# Patient Record
Sex: MALE | Race: BLACK OR AFRICAN AMERICAN | NOT HISPANIC OR LATINO | Employment: FULL TIME | URBAN - METROPOLITAN AREA
[De-identification: names, ages, dates, MRNs, and addresses within clinical notes are randomized per-mention and may not be internally consistent; named-entity substitution may affect disease eponyms.]

---

## 2021-04-21 ENCOUNTER — TRANSFERRED RECORDS (OUTPATIENT)
Dept: HEALTH INFORMATION MANAGEMENT | Facility: CLINIC | Age: 31
End: 2021-04-21

## 2021-04-21 ENCOUNTER — MEDICAL CORRESPONDENCE (OUTPATIENT)
Dept: HEALTH INFORMATION MANAGEMENT | Facility: CLINIC | Age: 31
End: 2021-04-21

## 2021-04-22 ENCOUNTER — TRANSCRIBE ORDERS (OUTPATIENT)
Dept: OTHER | Age: 31
End: 2021-04-22

## 2021-04-22 DIAGNOSIS — H40.002 GLAUCOMA SUSPECT, LEFT: Primary | ICD-10-CM

## 2021-05-05 ENCOUNTER — OFFICE VISIT (OUTPATIENT)
Dept: OPHTHALMOLOGY | Facility: CLINIC | Age: 31
End: 2021-05-05
Attending: OPTOMETRIST
Payer: COMMERCIAL

## 2021-05-05 DIAGNOSIS — H40.002 GLAUCOMA SUSPECT, LEFT: ICD-10-CM

## 2021-05-05 DIAGNOSIS — H47.20 OPTIC NERVE ATROPHY, LEFT: Primary | ICD-10-CM

## 2021-05-05 PROCEDURE — 92133 CPTRZD OPH DX IMG PST SGM ON: CPT | Performed by: OPHTHALMOLOGY

## 2021-05-05 PROCEDURE — G0463 HOSPITAL OUTPT CLINIC VISIT: HCPCS

## 2021-05-05 PROCEDURE — 76514 ECHO EXAM OF EYE THICKNESS: CPT | Mod: 26 | Performed by: OPHTHALMOLOGY

## 2021-05-05 PROCEDURE — 92020 GONIOSCOPY: CPT | Mod: 59 | Performed by: OPHTHALMOLOGY

## 2021-05-05 PROCEDURE — 92004 COMPRE OPH EXAM NEW PT 1/>: CPT | Performed by: OPHTHALMOLOGY

## 2021-05-05 RX ORDER — TIMOLOL MALEATE 6.8 MG/ML
1 SOLUTION/ DROPS OPHTHALMIC 2 TIMES DAILY
COMMUNITY
Start: 2021-04-21 | End: 2022-03-07

## 2021-05-05 RX ORDER — LATANOPROST 50 UG/ML
1 SOLUTION/ DROPS OPHTHALMIC AT BEDTIME
COMMUNITY
Start: 2021-04-21 | End: 2022-03-07

## 2021-05-05 ASSESSMENT — VISUAL ACUITY
METHOD: SNELLEN - LINEAR
OD_SC: 20/20
OS_SC: 20/125
OS_PH_SC: 20/60

## 2021-05-05 ASSESSMENT — PACHYMETRY
OS_CT(UM): 469
OD_CT(UM): 491

## 2021-05-05 ASSESSMENT — CONF VISUAL FIELD
OS_NORMAL: 1
OD_NORMAL: 1
METHOD: COUNTING FINGERS

## 2021-05-05 ASSESSMENT — EXTERNAL EXAM - LEFT EYE: OS_EXAM: NORMAL

## 2021-05-05 ASSESSMENT — TONOMETRY
OD_IOP_MMHG: 18
IOP_METHOD: APPLANATION
OS_IOP_MMHG: 20

## 2021-05-05 ASSESSMENT — SLIT LAMP EXAM - LIDS
COMMENTS: NORMAL
COMMENTS: NORMAL

## 2021-05-05 ASSESSMENT — EXTERNAL EXAM - RIGHT EYE: OD_EXAM: NORMAL

## 2021-05-05 NOTE — LETTER
"5/5/2021       RE: Silviano Edward  [address]  [address]  [place] [address]       CC -   Glaucoma evaluation    INTERVAL HISTORY - Initial visit    HPI -    Silviano Edward is a  30 year old year-old patient presenting for glaucoma evaluation. Pt noted in Sept 2020 when renewing license at the [employer] that his LE seemed to have very poor vision compared to the right. Pt states he was not aware of this difference until then. Never had an eye examination before. He did not seek medical care at that time because he was \"able to read.\"   He states he finally made an eye exam appointment and was seen on 4/21/2021 when it was determined he needed full glaucoma evaluation as pt's IOP was elevated. He was started on Timolol BID to LE and Latanoprost at bedtime to LE but he stopped using them four days ago as 'LE was becoming red and itchy'.   Negative for hx ocular infections, trauma, strabismus.  He has no other systemic diagnosis; that he is very healthy. Notes occasional headaches. Denies seizures or other neurologic diseases.   Negative for family history of ocular and systemic disease.    Works with [employer] (working on [employer]).     RETINAL IMAGING:  OCT 5/5/21  OD - within normal limits   OS - global thinning       ASSESSMENT & PLAN    1. Optic nerve atrophy, left    2. Glaucoma suspect, left    Incidental finding of left vision loss with unknown duration  Left eye optic atrophy with shunt vessels; no signs of previous retinal vascular accident  Mild proptosis: Jean measurement at 100 mm: 18 right eye and 21 left eye   DDx include optic nerve sheet/skull base/optic canal meningioma vs other mass lesions causing compressive optic atophy  Discussed the findings and its differentials with the patient.     MRI brain and orbit with and without contrast ordered  Refer to Neuro-ophthalmology clinic with Dr. Hernandez      Complete documentation of historical and exam elements from today's encounter can be found in " the full encounter summary report (not reduplicated in this progress note). I personally obtained the chief complaint(s) and history of present illness.  I confirmed and edited as necessary the review of systems, past medical/surgical history, family history, social history, and examination findings as documented by others; and I examined the patient myself. I personally reviewed the relevant tests, images, and reports as documented above. I formulated and edited as necessary the assessment and plan and discussed the findings and management plan with the patient and family.     Teddy Morel MD, PhD  , Vitreoretinal Surgery  Department of Ophthalmology  [facility]

## 2021-05-05 NOTE — NURSING NOTE
Chief Complaints and History of Present Illnesses   Patient presents with     Glaucoma Evaluation     Chief Complaint(s) and History of Present Illness(es)     Glaucoma Evaluation     Laterality: left eye    Quality: blurred    Associated symptoms: Negative for eye pain, floaters, itching, discharge, photophobia, previous episodes and tearing    Treatment side effects: redness and itching    Compliance with Treatment: Stopped last Saturday as LE was becoming red and itchy. Otherwise, pt states he was taking faithfully since drops first prescribed on 4/21/21.  Alicia Brothers COT COT 7:42 AM 05/05/2021      Pain scale: 0/10              Comments     Referred by Sarah Sprague O.D. at [facility].  Pt noted in Sept when renewing license at the [facility] that his LE seemed to have very poor vision compared to the right. Pt states he was not aware of this difference until then.  Pt states he waited for awhile to see if VA would improve before having an eye exam. He states he finally made an eye exam appointment and was seen on 4/21/2021 by Dr. Sprague when it was determined he needed full glaucoma evaluation as pt's IOP was elevated. Pt states he has never worn glasses or contacts that 4/21/2021 this was his very first eye exam.  Pt states he was started on Timolol BID to LE and Latanoprost at bedtime to LE. He stopped using both last Saturday as 'LE was becoming red and itchy'.  Pt states negative for ocular infections, trauma, strabismus.  Pt states he has no other systemic diagnosis; that he is very healthy.  Pt states negative for family ocular history and systemic disease.    MONICO Hopper COT 7:45 AM 05/05/2021

## 2021-05-05 NOTE — LETTER
5/5/2021      RE: Silviano Edward  [address]  [address]  [place] [address]       CC -   Glaucoma evaluation    INTERVAL HISTORY - Initial visit    HPI -   Silviano Edward is a  30 year old year-old patient presenting for glaucoma evaluation. Pt noted in Sept when renewing license at the [facility] that his LE seemed to have very poor vision compared to the right. Pt states he was not aware of this difference until then.    He noticed vision left eye is not normal September 2020. He did not seek medical care at that time because he was able to read.     Pt states he waited for awhile to see if VA would improve before having an eye exam. He states he finally made an eye exam appointment and was seen on 4/21/2021 when it was determined he needed full glaucoma evaluation as pt's IOP was elevated. Pt states he has never worn glasses or contacts that 4/21/2021 this was his very first eye exam.  Left eye gets smaller with headaches.     Pt states he was started on Timolol BID to LE and Latanoprost at bedtime to LE. He stopped using both four days ago as 'LE was becoming red and itchy'.  Pt states negative for ocular infections, trauma, strabismus.  Pt states he has no other systemic diagnosis; that he is very healthy.  Pt states negative for family ocular history and systemic disease.    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     PAST OCULAR SURGERY      RETINAL IMAGING:  OCT 5/5/21  OD - within normal limits   OS - global thinning       ASSESSMENT & PLAN    1. Glaucoma suspect, left        Jean measurement at 100: 18 right eye and 21 left eye   MRI brain and orbit with and without contrast ordered    return to clinic: ***    Complete documentation of historical and exam elements from today's encounter can be found in the full encounter summary report (not reduplicated in this progress note). I personally obtained the chief complaint(s) and history of present illness.  I confirmed and edited as necessary the review  "of systems, past medical/surgical history, family history, social history, and examination findings as documented by others; and I examined the patient myself. I personally reviewed the relevant tests, images, and reports as documented above. I formulated and edited as necessary the assessment and plan and discussed the findings and management plan with the patient and family.     Teddy Morel MD      CC -   Glaucoma evaluation    INTERVAL HISTORY - Initial visit    HPI -    Silviano Edward is a  30 year old year-old patient presenting for glaucoma evaluation. Pt noted in Sept 2020 when renewing license at the [facility] that his LE seemed to have very poor vision compared to the right. Pt states he was not aware of this difference until then. Never had an eye examination before. He did not seek medical care at that time because he was \"able to read.\"   He states he finally made an eye exam appointment and was seen on 4/21/2021 when it was determined he needed full glaucoma evaluation as pt's IOP was elevated. He was started on Timolol BID to LE and Latanoprost at bedtime to LE but he stopped using them four days ago as 'LE was becoming red and itchy'.   Negative for hx ocular infections, trauma, strabismus.  He has no other systemic diagnosis; that he is very healthy. Notes occasional headaches. Denies seizures or other neurologic diseases.   Negative for family history of ocular and systemic disease.    Works with [employer] (working on [employer]).     RETINAL IMAGING:  OCT 5/5/21  OD - within normal limits   OS - global thinning       ASSESSMENT & PLAN    1. Optic nerve atrophy, left    2. Glaucoma suspect, left    Incidental finding of left vision loss with unknown duration  Left eye optic atrophy with shunt vessels; no signs of previous retinal vascular accident  Mild proptosis: Jean measurement at 100 mm: 18 right eye and 21 left eye   DDx include optic nerve sheet/skull base/optic canal meningioma vs other " mass lesions causing compressive optic atophy  Discussed the findings and its differentials with the patient.     MRI brain and orbit with and without contrast ordered  Refer to Neuro-ophthalmology clinic with Dr. Hernandez      Complete documentation of historical and exam elements from today's encounter can be found in the full encounter summary report (not reduplicated in this progress note). I personally obtained the chief complaint(s) and history of present illness.  I confirmed and edited as necessary the review of systems, past medical/surgical history, family history, social history, and examination findings as documented by others; and I examined the patient myself. I personally reviewed the relevant tests, images, and reports as documented above. I formulated and edited as necessary the assessment and plan and discussed the findings and management plan with the patient and family.     Teddy Morel MD, PhD  , Vitreoretinal Surgery  Department of Ophthalmology  [facility]

## 2021-05-05 NOTE — PROGRESS NOTES
"CC -   Glaucoma evaluation    INTERVAL HISTORY - Initial visit    HPI -    Litzyal JOHN Edward is a  30 year old year-old patient presenting for glaucoma evaluation. Pt noted in Sept 2020 when [facility] [employer] that his LE seemed to have very poor vision compared to the right. Pt states he was not aware of this difference until then. Never had an eye examination before. He did not seek medical care at that time because he was \"able to read.\"   He states he finally made an eye exam appointment and was seen on 4/21/2021 when it was determined he needed full glaucoma evaluation as pt's IOP was elevated. He was started on Timolol BID to LE and Latanoprost at bedtime to LE but he stopped using them four days ago as 'LE was becoming red and itchy'.   Negative for hx ocular infections, trauma, strabismus.  He has no other systemic diagnosis; that he is very healthy. Notes occasional headaches. Denies seizures or other neurologic diseases.   Negative for family history of ocular and systemic disease.    Works with [employer] (working on [employer]).     RETINAL IMAGING:  OCT 5/5/21  OD - within normal limits   OS - global thinning       ASSESSMENT & PLAN    1. Optic nerve atrophy, left    2. Glaucoma suspect, left    Incidental finding of left vision loss with unknown duration  Left eye optic atrophy with shunt vessels; no signs of previous retinal vascular accident  Mild proptosis: Jean measurement at 100 mm: 18 right eye and 21 left eye   DDx include optic nerve sheet/skull base/optic canal meningioma vs other mass lesions causing compressive optic atophy  Discussed the findings and its differentials with the patient.     MRI brain and orbit with and without contrast ordered  Refer to Neuro-ophthalmology clinic with Dr. Hernandez      Complete documentation of historical and exam elements from today's encounter can be found in the full encounter summary report (not reduplicated in this progress note). I " personally obtained the chief complaint(s) and history of present illness.  I confirmed and edited as necessary the review of systems, past medical/surgical history, family history, social history, and examination findings as documented by others; and I examined the patient myself. I personally reviewed the relevant tests, images, and reports as documented above. I formulated and edited as necessary the assessment and plan and discussed the findings and management plan with the patient and family.     Teddy Morel MD, PhD  , Vitreoretinal Surgery  Department of Ophthalmology  [facility]

## 2021-05-25 ENCOUNTER — ANCILLARY PROCEDURE (OUTPATIENT)
Dept: MRI IMAGING | Facility: CLINIC | Age: 31
End: 2021-05-25
Attending: OPHTHALMOLOGY
Payer: COMMERCIAL

## 2021-05-25 DIAGNOSIS — H47.20 OPTIC NERVE ATROPHY, LEFT: ICD-10-CM

## 2021-05-25 PROCEDURE — 70543 MRI ORBT/FAC/NCK W/O &W/DYE: CPT | Performed by: RADIOLOGY

## 2021-05-25 PROCEDURE — A9585 GADOBUTROL INJECTION: HCPCS | Performed by: RADIOLOGY

## 2021-05-25 PROCEDURE — 70553 MRI BRAIN STEM W/O & W/DYE: CPT | Performed by: RADIOLOGY

## 2021-05-25 RX ORDER — GADOBUTROL 604.72 MG/ML
10 INJECTION INTRAVENOUS ONCE
Status: COMPLETED | OUTPATIENT
Start: 2021-05-25 | End: 2021-05-25

## 2021-05-25 RX ADMIN — GADOBUTROL 10 ML: 604.72 INJECTION INTRAVENOUS at 10:29

## 2021-05-25 NOTE — DISCHARGE INSTRUCTIONS
MRI Contrast Discharge Instructions    The IV contrast you received today will pass out of your body in your  urine. This will happen in the next 24 hours. You will not feel this process.  Your urine will not change color.    Drink at least 4 extra glasses of water or juice today (unless your doctor  has restricted your fluids). This reduces the stress on your kidneys.  You may take your regular medicines.    If you are on dialysis: It is best to have dialysis today.    If you have a reaction: Most reactions happen right away. If you have  any new symptoms after leaving the hospital (such as hives or swelling),  call your hospital at the correct number below. Or call your family doctor.  If you have breathing distress or wheezing, call 911.    Special instructions: ***    I have read and understand the above information.    Signature:______________________________________ Date:___________    Staff:__________________________________________ Date:___________     Time:__________    [facility] Departments:    ___[place]: [phone]  ___[facility]: [phone]  ___[place]: [phone] ___[facility]: [phone]  ___[facility]: [phone]  ___[facility]: [phone]  ___[phone]  ___[facility]: [phone]  ___[phone]

## 2021-05-27 ENCOUNTER — OFFICE VISIT (OUTPATIENT)
Dept: OPHTHALMOLOGY | Facility: CLINIC | Age: 31
End: 2021-05-27
Attending: OPHTHALMOLOGY
Payer: COMMERCIAL

## 2021-05-27 DIAGNOSIS — H40.002 GLAUCOMA SUSPECT, LEFT: ICD-10-CM

## 2021-05-27 DIAGNOSIS — H47.20 OPTIC NERVE ATROPHY, LEFT: ICD-10-CM

## 2021-05-27 DIAGNOSIS — H53.10 SUBJECTIVE VISUAL DISTURBANCE: Primary | ICD-10-CM

## 2021-05-27 PROCEDURE — 92083 EXTENDED VISUAL FIELD XM: CPT | Performed by: OPHTHALMOLOGY

## 2021-05-27 PROCEDURE — G0463 HOSPITAL OUTPT CLINIC VISIT: HCPCS | Mod: 25

## 2021-05-27 PROCEDURE — 99215 OFFICE O/P EST HI 40 MIN: CPT | Mod: GC | Performed by: OPHTHALMOLOGY

## 2021-05-27 PROCEDURE — G0463 HOSPITAL OUTPT CLINIC VISIT: HCPCS

## 2021-05-27 ASSESSMENT — VISUAL ACUITY
OD_SC: 20/20
OS_SC: 20/150
METHOD: SNELLEN - LINEAR
OS_PH_SC: 20/60

## 2021-05-27 ASSESSMENT — CONF VISUAL FIELD
OS_NORMAL: 1
OD_NORMAL: 1

## 2021-05-27 ASSESSMENT — CUP TO DISC RATIO
OD_RATIO: 0.3
OS_RATIO: 0.9

## 2021-05-27 ASSESSMENT — SLIT LAMP EXAM - LIDS
COMMENTS: NORMAL
COMMENTS: NORMAL

## 2021-05-27 ASSESSMENT — EXTERNAL EXAM - RIGHT EYE: OD_EXAM: NORMAL

## 2021-05-27 ASSESSMENT — TONOMETRY
OD_IOP_MMHG: 20
IOP_METHOD: ICARE
OS_IOP_MMHG: 22

## 2021-05-27 ASSESSMENT — EXTERNAL EXAM - LEFT EYE: OS_EXAM: NORMAL

## 2021-05-27 NOTE — NURSING NOTE
Chief Complaints and History of Present Illnesses   Patient presents with     Blurred Vision Evaluation     Chief Complaint(s) and History of Present Illness(es)     Blurred Vision Evaluation               Comments     Silviano Edward is a 30 year old male who presents today for     1. Optic nerve atrophy, left   2. Glaucoma suspect, left     No vision changes since last visit with Dr. Morel  Was Rxd timolol and latanoprost, but patient is taking neither.     Marium GUO 10:27 AM May 27, 2021

## 2021-05-27 NOTE — LETTER
May 31, 2021    RE: Silviano Edward  : 1990  MRN: [id number]    Dear Dr. Morel    Thank you for referring your patient, Silviano Edward, to my neuro-ophthalmology clinic recently.  After a thorough neuro-ophthalmic history and examination, I came to the following conclusions:     1. 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  On examination, vision is decreased in the left eye to 20/60 and color vision is 8/11.  There is a prominent left APD.  He has a 0.9 left C/D ratio with multiple associated optic ciliary shunt vessels.  His examination is otherwise normal.  He has significantly decreased MD on [place] left eye, with possible superior arcuate deficit affecting central fixation.  His OCT rNFL is diffusely thin left eye.      His MRI Brain and Orbits was unrevealing for a cause of his unilateral optic atrophy, with no compressive or inflammatory lesion.  I reviewed these images myself.  The fact that he was not aware of his vision loss in 2020 (no history of acute vision loss) and that he has shunt vessels indicate a lonstanding/chronic process.  His IOP was elevated to 42 at the optometrist visit with Dr. Sprague on 2021 although potentially this reading was complicated by the patient squeezing with false elevation.    In the context of the patient's dramatic optic nerve head cupping and documented high intraocular pressures in the setting of an excellent quality MRI failing to show any structural correlate I believe that this represents an unusual presentation of unilateral or asymmetric glaucoma.  This degree of cupping simply does not occur outside of the context of glaucoma.  It is plausible that there is some other concomitant form of nonglaucomatous optic neuropathy but given the negative MRI I am unable to identify any at this time.    While the patient does not note any history of left eye trauma the degree of asymmetry in his cupping would  raise the possibility of a traumatic glaucoma in the left eye.  We were unable to effectively perform gonioscopy today due to squeezing.    I will refer this patient onto my colleague in glaucoma Dr. Yusuf for further evaluation and management.  He may require surgical intervention given the degree of cupping and intolerance to prior drops.    HPI:  Silviano Edward is a pleasant 30 year old Black or  male who presents to my neuro-ophthalmology clinic today upon referral from Dr. Morel for evaluation of unilateral left eye optic atrophy.  In 9/2020, he went for a [employer] license renewal and was noted to have poor vision in the left eye compared to the right eye.  He wasn't aware there was a difference between the 2 eyes until it was pointed out to him.  He had never had a eye examination prior to that.  He does note that he had vision screenings prior to that which he thinks were normal and tested under monocular conditions.  He went to visit Dr. Sarah Sprague (optometry) and was noted to have IOP of 42 in the left eye (16 in the right eye).  He remembers squeezing his eyes shut during the examination.  He was started on timolol BID and latanoprost at bedtime in both eyes.  He took those medicines for several days but stopped them due to itching and redness, and he hasn't taken them since.  He was seen by Dr. Morel on 5/5/2021 and noted to have unilateral optic atrophy with shunt vessels in the left eye, along with 3 mm of proptosis, and MRI Brain and Orbits WWO was ordered, which was negative for inflammatory or mass lesion.  His vision is exactly the same as it was in 9/2020 by patient report.      No prior trauma.  No light sensitivity.  No haloes.  No pain or pain with eye movements.  No redness/discharge (aside from when he used the glaucoma drops).              He thinks in 2018/19 he passed a screening test for vision without difficulty.    Review of outside testing:  MR Brain and Orbits WWO  5/25/2021:  Impression:    1. Left optic nerve atrophy. No definite abnormal contrast enhancement  or mass in the orbits.  2. Regarding the remainder of the brain, no abnormalities are  demonstrated.    My interpretation performed today of outside testing:  I reviewed these MRI images today and agree with the interpretation.      May 5, 2021  OCT rNFL  Right eye within normal limits compared to age-matched controls   Left eye severe global atrophy    Review of outside clinical notes:  Dr. Morel 5/5/2021  Dr. 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  Cranial nerves V1-3, 7,8,9,11, and 12 were normal bilaterally.    Tests ordered and interpreted today:  Octopus automated 30 degree visual field-essentially full in the right eye with diffuse mild scattered points of depression nasally.  Poor reliability with false negative errors of 44% and global depression.  Pattern deviation showing superior altitudinal type defect splitting fixation.        Again, thank you for trusting me with the care of your patient.  For further exam details, please feel free to contact our office for additional records.  If you wish to contact me regarding this patient please email me at [email or web address] or give my clinic a call to arrange a phone conversation.      Sincerely,    Maikel Hernandez MD  , Neuro-Ophthalmology and Adult Strabismus Surgery  The Yolanda MATTA  and Abigail Benjamin Chair in Neuro-Ophthalmology  Department of Ophthalmology and Visual Neurosciences  [facility]    DX: probable unilateral glaucoma

## 2021-05-27 NOTE — PROGRESS NOTES
1. 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  On examination, vision is decreased in the left eye to 20/60 and color vision is 8/11.  There is a prominent left APD.  He has a 0.9 left C/D ratio with multiple associated optic ciliary shunt vessels.  His examination is otherwise normal.  He has significantly decreased MD on Gtop left eye, with possible superior arcuate deficit affecting central fixation.  His OCT rNFL is diffusely thin left eye.      His MRI Brain and Orbits was unrevealing for a cause of his unilateral optic atrophy, with no compressive or inflammatory lesion.  I reviewed these images myself.  The fact that he was not aware of his vision loss in 9/2020 (no history of acute vision loss) and that he has shunt vessels indicate a lonstanding/chronic process.  His IOP was elevated to 42 at the optometrist visit with Dr. Sprague on 4/21/2021 although potentially this reading was complicated by the patient squeezing with false elevation.    In the context of the patient's dramatic optic nerve head cupping and documented high intraocular pressures in the setting of an excellent quality MRI failing to show any structural correlate I believe that this represents an unusual presentation of unilateral or asymmetric glaucoma.  This degree of cupping simply does not occur outside of the context of glaucoma.  It is plausible that there is some other concomitant form of nonglaucomatous optic neuropathy but given the negative MRI I am unable to identify any at this time.    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  We were unable to effectively perform gonioscopy today due to squeezing.    I will refer this patient onto my colleague in glaucoma Dr. Yusuf for further evaluation and management.  He may require surgical  intervention given the degree of cupping and intolerance to prior drops.    HPI:  Silviano Edward is a pleasant 30 year old Black or  male who presents to my neuro-ophthalmology clinic today upon referral from Dr. Morel for evaluation of unilateral left eye optic atrophy.  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  He wasn't aware there was a difference between the 2 eyes until it was pointed out to him.  He had never had a eye examination prior to that.  He does note that he had vision screenings prior to that which he thinks were normal and tested under monocular conditions.  He went to visit Dr. Sarah Sprague (optometry) and was noted to have IOP of 42 in the left eye (16 in the right eye).  He remembers squeezing his eyes shut during the examination.  He was started on timolol BID and latanoprost at bedtime in both eyes.  He took those medicines for several days but stopped them due to itching and redness, and he hasn't taken them since.  He was seen by Dr. Morel on 5/5/2021 and noted to have unilateral optic atrophy with shunt vessels in the left eye, along with 3 mm of proptosis, and MRI Brain and Orbits WWO was ordered, which was negative for inflammatory or mass lesion.  His vision is exactly the same as it was in 9/2020 by patient report.      No prior trauma.  No light sensitivity.  No haloes.  No pain or pain with eye movements.  No redness/discharge (aside from when he used the glaucoma drops).              He thinks in 2018/19 he passed a screening test for vision without difficulty.    Review of outside testing:  MR Brain and Orbits WWO 5/25/2021:  Impression:    1. Left optic nerve atrophy. No definite abnormal contrast enhancement  or mass in the orbits.  2. Regarding the remainder of the brain, no abnormalities are  demonstrated.    My interpretation performed today of outside testing:  I reviewed these  MRI images today and agree with the interpretation.      May 5, 2021  OCT rNFL  Right eye within normal limits compared to age-matched controls   Left eye severe global atrophy    Review of outside clinical notes:  Dr. Morel 5/5/2021  Dr. 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  Cranial nerves V1-3, 7,8,9,11, and 12 were normal bilaterally.    Tests ordered and interpreted today:  Octopus automated 30 degree visual field-essentially full in the right eye with diffuse mild scattered points of depression nasally.  Poor reliability with false negative errors of 44% and global depression.  Pattern deviation showing superior altitudinal type defect splitting fixation.           45 minutes were spent on the date of the encounter by me doing chart review, history and exam, documentation, and further activities as noted above    Complete documentation of historical and exam elements from today's encounter can be found in the full encounter summary report (not reduplicated in this progress note).  I personally obtained the chief complaint(s) and history of present illness.  I confirmed and edited as necessary the review of systems, past medical/surgical history, family history, social history, and examination findings as documented by others; and I examined the patient myself.  I personally reviewed the relevant tests, images, and reports as documented above.  I  formulated and edited as necessary the assessment and plan and discussed the findings and management plan with the patient and family.  I personally reviewed the ophthalmic test(s) associated with this encounter, agree with the interpretation(s) as documented by the resident/fellow, and have edited the corresponding report(s) as necessary.     MD Rainer Valdez, PGY3  Ophthalmology Resident

## 2021-06-21 ENCOUNTER — OFFICE VISIT (OUTPATIENT)
Dept: OPHTHALMOLOGY | Facility: CLINIC | Age: 31
End: 2021-06-21
Attending: OPHTHALMOLOGY
Payer: COMMERCIAL

## 2021-06-21 DIAGNOSIS — H52.12 MYOPIA OF LEFT EYE: ICD-10-CM

## 2021-06-21 DIAGNOSIS — Q15.0 OAG (OPEN ANGLE GLAUCOMA), JUVENILE: Primary | ICD-10-CM

## 2021-06-21 PROCEDURE — G0463 HOSPITAL OUTPT CLINIC VISIT: HCPCS

## 2021-06-21 PROCEDURE — 92083 EXTENDED VISUAL FIELD XM: CPT | Performed by: OPHTHALMOLOGY

## 2021-06-21 PROCEDURE — 99214 OFFICE O/P EST MOD 30 MIN: CPT | Mod: GC | Performed by: OPHTHALMOLOGY

## 2021-06-21 PROCEDURE — 92020 GONIOSCOPY: CPT | Performed by: OPHTHALMOLOGY

## 2021-06-21 RX ORDER — DORZOLAMIDE HYDROCHLORIDE AND TIMOLOL MALEATE 20; 5 MG/ML; MG/ML
1 SOLUTION/ DROPS OPHTHALMIC 2 TIMES DAILY
Qty: 10 ML | Refills: 11 | Status: SHIPPED | OUTPATIENT
Start: 2021-06-21 | End: 2022-09-20

## 2021-06-21 ASSESSMENT — PACHYMETRY
OS_CT(UM): 469
OD_CT(UM): 491

## 2021-06-21 ASSESSMENT — VISUAL ACUITY
OS_PH_SC+: -1
OS_SC: 20/200
OD_SC: 20/20
OS_PH_SC: 20/60
METHOD: SNELLEN - LINEAR

## 2021-06-21 ASSESSMENT — TONOMETRY
OS_IOP_MMHG: 41
OS_IOP_MMHG: 35
OD_IOP_MMHG: 17
IOP_METHOD: APPLANATION BY JMK
OS_IOP_MMHG: 41
IOP_METHOD: TONOPEN
OD_IOP_MMHG: 22
IOP_METHOD: TONOPEN

## 2021-06-21 ASSESSMENT — EXTERNAL EXAM - RIGHT EYE: OD_EXAM: NORMAL

## 2021-06-21 ASSESSMENT — CONF VISUAL FIELD
METHOD: COUNTING FINGERS
OS_NORMAL: 1
OD_NORMAL: 1

## 2021-06-21 ASSESSMENT — CUP TO DISC RATIO
OD_RATIO: 0.3
OS_RATIO: 0.85

## 2021-06-21 ASSESSMENT — SLIT LAMP EXAM - LIDS
COMMENTS: NORMAL
COMMENTS: NORMAL

## 2021-06-21 ASSESSMENT — EXTERNAL EXAM - LEFT EYE: OS_EXAM: NORMAL

## 2021-06-21 NOTE — NURSING NOTE
Chief Complaints and History of Present Illnesses   Patient presents with     Glaucoma     Chief Complaint(s) and History of Present Illness(es)     Glaucoma     Laterality: left eye    Quality: blurred              Comments     Pt denies any vision changes BE since last visit.  Denies any pain or discomfort.  Ocular meds: None    Kristi Hopkins OT 1:23 PM June 21, 2021

## 2021-06-21 NOTE — PROGRESS NOTES
Chief Complaint/Presenting Concern: Glaucoma evaluation    History of Present Illness:   Silviano Edward is a 30 year old patient who presents for evaluation of glaucoma. Patient was referred to glaucoma clinic by Dr. Morel for evaluation of glaucoma. He was evaluated by Dr. Morel on 5/5/21 as a glaucoma referral by an outside clinic; he had originally been referred to any eye provider because he was unable to renew his [place] drivers license. He had not had an eye exam prior to this. The outside eye provider ([facility]) found IOP 16/42 mmHg and started him on Timolol 2x/day and Latanoprost at bedtime in the left eye; but he stopped both of these prior to his visit with Dr. Morel due to redness and itching. Patient reports he first noticed the vision in the left eye deteriorate about 1 year ago but he did not make much of it because he was still able to read with both eyes open.      Patient was evaluated by Neuro-Ophthalmology due to the asymmetry of his disease. After a thorough workup, including MRI brain and orbits, it was ultimately decided his vision loss was related to glaucoma.     Patient endorses chronic intermittent headaches that are exacerbated by sleep deprivation, usually unilateral (left > right). Describes it as sharp. No associated vision changes with the headache.     Relevant Past Medical/Family/Social History: Healthy    Relevant Review of Systems: None relevant     Diagnosis: Juvenile Open Angle Glaucoma left eye   Glaucoma suspect right eye   Year diagnosis: 2021  Previous glaucoma surgery/laser: none  Maximum intraocular pressure 22/41 mmHg   Current Meds: None  Family history: negative  CCT: 491/469  Gonio: open to scleral spur 360 in each eye, light pigmentation   Trauma history: negative  Steroid exposure: negative  Vasospastic disease: Migrane/Raynaud phenomenon: negative  A past hemodynamic crisis or Low BP: negative  Meds AEs/intolerance: ?Timolol and latanoprost (itchiness,  redness)  PMHx: Negative for asthma and respiratory problems/Cardiac/Renal/Kidney stones/Sulfa Allergy  Anticoagulants: None    Prior testing  OCT Optic Nerve RNFL Spectralis May 5, 2021  right eye: healthy RNFL and GCL  left eye: diffuse RNFL thinning, loss of GLC    Today's testing:  IOP 17/35 mmHg  Visual field June 21, 2021:   Right eye - inferior > superior deficits, high false negative, could be fluctuating as arcuates not see on VF from 5/2021; Left eye - small central island, poor reliability    Additional Diagnosis:    2. Mild Myopia left eye     Plan/Recommendations:    Discussed findings with patient. Log discussion with the patient on the risk of losing vision if IOP is not well controlled.     Start Cosopt BID in the left eye     Consider SLT left eye     Repeat VF in the right eye on next visit, if defects are persistent then would start treatment     RTC 2 weeks, VA, IOP, VF (24-2 right eye and LVC left eye)     Saeid Wolf MD  Ophthalmology PGY-3  [facility]     Physician Attestation     Attending Physician Attestation:  Complete documentation of historical and exam elements from today's encounter can be found in the full encounter summary report (not reduplicated in this progress note). I personally obtained the chief complaint(s) and history of present illness. I confirmed and edited as necessary the review of systems, past medical/surgical history, family history, social history, and examination findings as documented by others; and I examined the patient myself. I personally reviewed the relevant tests, images, and reports as documented above. I personally reviewed the ophthalmic test(s) associated with this encounter, agree with the interpretation(s) as documented by the resident/fellow and have edited the corresponding report(s) as necessary. I formulated and edited as necessary the assessment and plan and discussed the findings and management plan with the patient and any family  members present at the time of the visit.  Eri Yusuf M.D., Glaucoma, June 21, 2021

## 2021-07-12 DIAGNOSIS — Q15.0 OAG (OPEN ANGLE GLAUCOMA), JUVENILE: Primary | ICD-10-CM

## 2021-07-13 ENCOUNTER — OFFICE VISIT (OUTPATIENT)
Dept: OPHTHALMOLOGY | Facility: CLINIC | Age: 31
End: 2021-07-13
Attending: OPHTHALMOLOGY
Payer: COMMERCIAL

## 2021-07-13 DIAGNOSIS — Q15.0 OAG (OPEN ANGLE GLAUCOMA), JUVENILE: ICD-10-CM

## 2021-07-13 DIAGNOSIS — H52.12 MYOPIA OF LEFT EYE: Primary | ICD-10-CM

## 2021-07-13 PROCEDURE — 99212 OFFICE O/P EST SF 10 MIN: CPT | Performed by: OPHTHALMOLOGY

## 2021-07-13 PROCEDURE — G0463 HOSPITAL OUTPT CLINIC VISIT: HCPCS

## 2021-07-13 PROCEDURE — 92083 EXTENDED VISUAL FIELD XM: CPT | Performed by: OPHTHALMOLOGY

## 2021-07-13 ASSESSMENT — TONOMETRY
OS_IOP_MMHG: 16
OD_IOP_MMHG: 18
IOP_METHOD: APPLANATION
OS_IOP_MMHG: 18
OD_IOP_MMHG: 16
IOP_METHOD: TONOPEN

## 2021-07-13 ASSESSMENT — EXTERNAL EXAM - LEFT EYE: OS_EXAM: NORMAL

## 2021-07-13 ASSESSMENT — VISUAL ACUITY
OS_SC: 20/80
OD_SC: 20/20
METHOD: SNELLEN - LINEAR
OS_PH_SC: 20/60
OS_PH_SC+: -1

## 2021-07-13 ASSESSMENT — SLIT LAMP EXAM - LIDS
COMMENTS: NORMAL
COMMENTS: NORMAL

## 2021-07-13 ASSESSMENT — CONF VISUAL FIELD
OS_NORMAL: 1
OD_NORMAL: 1

## 2021-07-13 ASSESSMENT — EXTERNAL EXAM - RIGHT EYE: OD_EXAM: NORMAL

## 2021-07-13 NOTE — PATIENT INSTRUCTIONS
Continue Cosopt (Timolol/Dorzolamide) which is a blue top- apply 1 drop twice a day in the left eye

## 2021-07-13 NOTE — PROGRESS NOTES
Silviano Edward is a 30 year old patient who presents for evaluation of glaucoma. Patient was referred to glaucoma clinic by Dr. Morel for evaluation of glaucoma. He was evaluated by Dr. Morel on 5/5/21 as a glaucoma referral by an outside clinic; he had originally been referred to any eye provider because he was unable to renew his [place] drivers license. He had not had an eye exam prior to this. The outside eye provider ([facility]) found IOP 16/42 mmHg and started him on Timolol 2x/day and Latanoprost at bedtime in the left eye; but he stopped both of these prior to his visit with Dr. Morel due to redness and itching. Patient was evaluated by Neuro-Ophthalmology due to the asymmetry of his disease. After a thorough workup, including MRI brain and orbits, it was ultimately decided his vision loss was related to glaucoma.       Chief Complaint/Presenting Concern: Glaucoma follow up     History of Present Illness:   Silviano Edward is a 30 year old patient who presents for glaucoma follow up. Started using Cosopt BID left eye. Reports tolerating this drop well, no redness or itching. Headaches have significantly improved since starting the Cosopt medication. Reports vision is stable.     Relevant Past Medical/Family/Social History: Healthy    Relevant Review of Systems: None relevant     Diagnosis: Juvenile Open Angle Glaucoma left eye   Glaucoma suspect right eye   Year diagnosis: 2021  Previous glaucoma surgery/laser: none  Maximum intraocular pressure 22/41 mmHg   Current Meds: None  Family history: negative  CCT: 491/469  Gonio: open to scleral spur 360 in each eye, light pigmentation   Trauma history: negative  Steroid exposure: negative  Vasospastic disease: Migrane/Raynaud phenomenon: negative  A past hemodynamic crisis or Low BP: negative  Meds AEs/intolerance: ?Timolol and latanoprost (itchiness, redness)  PMHx: Negative for asthma and respiratory problems/Cardiac/Renal/Kidney stones/Sulfa  Allergy  Anticoagulants: None  Prior testing  OCT Optic Nerve RNFL Spectralis May 5, 2021  right eye: healthy RNFL and GCL  left eye: diffuse RNFL thinning, loss of GLC    Today's testing:  IOP 16/16 mmHg  Repeat Visual field July 13, 2021:   Right eye -early nasal defect?  Left eye LVC: dense superior arcuate defect    Additional Diagnosis:     2. Mild Myopia left eye     Plan/Recommendations:    Discussed findings with patient. IOP much improved on Cosopt left eye     Continue Cosopt BID in the left eye, aim for IOP in low-mid teens due to advanced glaucoma damage left eye     Consider SLT left eye     RTC in 3 months VA, IOP     Physician Attestation     Attending Physician Attestation:  Complete documentation of historical and exam elements from today's encounter can be found in the full encounter summary report (not reduplicated in this progress note). I personally obtained the chief complaint(s) and history of present illness. I confirmed and edited as necessary the review of systems, past medical/surgical history, family history, social history, and examination findings as documented by others; and I examined the patient myself. I personally reviewed the relevant tests, images, and reports as documented above. I formulated and edited as necessary the assessment and plan and discussed the findings and management plan with the patient and any family members present at the time of the visit.  Eri Yusuf M.D., Glaucoma, July 13, 2021

## 2021-07-13 NOTE — NURSING NOTE
Chief Complaints and History of Present Illnesses   Patient presents with     Glaucoma Follow-Up     Chief Complaint(s) and History of Present Illness(es)     Glaucoma Follow-Up     Laterality: both eyes              Comments     Pt. States that he is doing well. VA seems to have improved LE. No pain BE. No dryness BE.   Katelynn MARC 10:11 AM July 13, 2021

## 2022-03-01 ENCOUNTER — OFFICE VISIT (OUTPATIENT)
Dept: OPHTHALMOLOGY | Facility: CLINIC | Age: 32
End: 2022-03-01
Attending: OPHTHALMOLOGY
Payer: COMMERCIAL

## 2022-03-01 DIAGNOSIS — H40.002 GLAUCOMA SUSPECT, LEFT: ICD-10-CM

## 2022-03-01 DIAGNOSIS — Q15.0 OAG (OPEN ANGLE GLAUCOMA), JUVENILE: Primary | ICD-10-CM

## 2022-03-01 PROCEDURE — 92133 CPTRZD OPH DX IMG PST SGM ON: CPT | Performed by: OPHTHALMOLOGY

## 2022-03-01 PROCEDURE — 99214 OFFICE O/P EST MOD 30 MIN: CPT | Mod: GC | Performed by: OPHTHALMOLOGY

## 2022-03-01 PROCEDURE — G0463 HOSPITAL OUTPT CLINIC VISIT: HCPCS

## 2022-03-01 PROCEDURE — 250N000013 HC RX MED GY IP 250 OP 250 PS 637: Performed by: STUDENT IN AN ORGANIZED HEALTH CARE EDUCATION/TRAINING PROGRAM

## 2022-03-01 RX ORDER — ACETAZOLAMIDE 250 MG/1
500 TABLET ORAL ONCE
Status: COMPLETED | OUTPATIENT
Start: 2022-03-01 | End: 2022-03-01

## 2022-03-01 RX ORDER — BRIMONIDINE TARTRATE 2 MG/ML
1 SOLUTION/ DROPS OPHTHALMIC EVERY 12 HOURS
Qty: 5 ML | Refills: 11 | Status: SHIPPED | OUTPATIENT
Start: 2022-03-01 | End: 2022-09-20

## 2022-03-01 RX ADMIN — ACETAZOLAMIDE 500 MG: 250 TABLET ORAL at 11:32

## 2022-03-01 ASSESSMENT — TONOMETRY
IOP_METHOD: APPLANATION
IOP_METHOD: TONOPEN
OS_IOP_MMHG: 45
OS_IOP_MMHG: 39
IOP_METHOD: APPLANATION
IOP_METHOD: TONOPEN
IOP_METHOD: TONOPEN
IOP_METHOD: APPLANATION
OS_IOP_MMHG: 47
OS_IOP_MMHG: 44
OS_IOP_MMHG: 38
OD_IOP_MMHG: 19
OS_IOP_MMHG: 20
OS_IOP_MMHG: 36
IOP_METHOD: TONOPEN
OD_IOP_MMHG: 18

## 2022-03-01 ASSESSMENT — VISUAL ACUITY
OD_SC+: -1
OS_PH_SC: 20/100
OS_SC: 20/300
OD_SC: 20/20
METHOD: SNELLEN - LINEAR

## 2022-03-01 ASSESSMENT — SLIT LAMP EXAM - LIDS
COMMENTS: NORMAL
COMMENTS: NORMAL

## 2022-03-01 ASSESSMENT — CUP TO DISC RATIO
OS_RATIO: 0.8
OD_RATIO: 0.3

## 2022-03-01 ASSESSMENT — EXTERNAL EXAM - RIGHT EYE: OD_EXAM: NORMAL

## 2022-03-01 ASSESSMENT — CONF VISUAL FIELD
OS_NORMAL: 1
OD_NORMAL: 1

## 2022-03-01 ASSESSMENT — EXTERNAL EXAM - LEFT EYE: OS_EXAM: NORMAL

## 2022-03-01 NOTE — PROGRESS NOTES
Silviano Edward is a 30 year old patient who presents for evaluation of glaucoma. Patient was referred to glaucoma clinic by Dr. Morel for evaluation of glaucoma. He was evaluated by Dr. Morel on 5/5/21 as a glaucoma referral by an outside clinic; he had originally been referred to any eye provider because he was unable to renew his [place] drivers license. He had not had an eye exam prior to this. The outside eye provider ([facility]) found IOP 16/42 mmHg and started him on Timolol 2x/day and Latanoprost at bedtime in the left eye; but he stopped both of these prior to his visit with Dr. Morel due to redness and itching. Patient was evaluated by Neuro-Ophthalmology due to the asymmetry of his disease. After a thorough workup, including MRI brain and orbits, it was ultimately decided his vision loss was related to glaucoma.       Chief Complaint/Presenting Concern: Glaucoma follow up     History of Present Illness:   Silviano Edward is a 30 year old patient who presents for glaucoma follow up. Started using Cosopt BID left eye. He used it consistently until December 2021, he had weight gain and he read on the Internet that this drop can cause weight gain and since December 2021 he only uses the cosopt sporadically if he feels like there is pressure in his eye or if his eye is tearing. HE gained 10-15 lbs since the summer but reports he was watching what he was eating.    Denies left eye pain, ralph headache. When closes right eye thinks vision in left eye is slowly getting worse. No nausea.    Patient reports hair loss, constipation, and weight gain. Hasn't had thyroid checked recently.       Relevant Past Medical/Family/Social History: Healthy    Relevant Review of Systems: Weight gain 10-15 lbs since July 2021     Diagnosis: Juvenile Open Angle Glaucoma left eye   Glaucoma suspect right eye   Year diagnosis: 2021  Previous glaucoma surgery/laser: none  Maximum intraocular pressure 22/47 mmHg   Current Meds:  Cosopt sporadically  Family history: negative  CCT: 491/469  Gonio: open to scleral spur 360 in each eye no vascular congestion, light pigmentation   Trauma history: negative  Steroid exposure: negative  Vasospastic disease: Migrane/Raynaud phenomenon: negative  A past hemodynamic crisis or Low BP: negative  Meds AEs/intolerance: ?Timolol and latanoprost (itchiness, redness)  PMHx: Negative for asthma and respiratory problems/Cardiac/Renal/Kidney stones/Sulfa Allergy  Anticoagulants: None  Prior testing  Repeat Visual field July 13, 2021:   Right eye -early nasal defect?  Left eye LVC: dense superior arcuate defect    Today's testing:  IOP 19/47 mmHg (uses cosopt left eye sporadically last used drop 1 week ago)  1045 AM Administered 1 drop of timolol, brimonidine, drozolamide, latanoprost   1120 AM Administered 500 mg PO acetazolamide   IOP taken at 1:10Pm was 20 mmHg     OCT Optic Nerve RNFL Spectralis  right eye: healthy RNFL and GCL  left eye: diffuse RNFL thinning, loss of GLC    Additional Diagnosis:     2. Mild Myopia left eye     3. Weight gain, hair loss, constipation  Recommended reach out to PCP for work up including thyroid    Plan/Recommendations:    Discussed findings with patient. IOP 40's left eye today off cosopt for a few months. IOP down to 20 mmHg on one drop of timolol, brimonidine, dorzolamide, latanoprost and 500 mg of aceteazolamide in clinic     Restart Cosopt BID in the left eye, aim for IOP in low-mid teens due to advanced glaucoma damage left eye. If above goal with medical management then consider SLT left eye     Discussed it is unlikely cosopt is contributing to patient's weight gain, recommended systemic work up (thyroid labs, etc) with PCP for recent symptoms including weight gain, hair loss, constipation     RTC next week VA, IOP     Lorena Bravo MD  Ophthalmology Resident, PGY-3  [facility]       Physician Attestation     Attending Physician Attestation:   Complete documentation of historical and exam elements from today's encounter can be found in the full encounter summary report (not reduplicated in this progress note). I personally obtained the chief complaint(s) and history of present illness. I confirmed and edited as necessary the review of systems, past medical/surgical history, family history, social history, and examination findings as documented by others; and I examined the patient myself. I personally reviewed the relevant tests, images, and reports as documented above. I personally reviewed the ophthalmic test(s) associated with this encounter, agree with the interpretation(s) as documented by the resident/fellow and have edited the corresponding report(s) as necessary. I formulated and edited as necessary the assessment and plan and discussed the findings and management plan with the patient and any family members present at the time of the visit.  Eri Yusuf M.D., Glaucoma, March 5, 2022

## 2022-03-01 NOTE — NURSING NOTE
Chief Complaints and History of Present Illnesses   Patient presents with     Follow Up     Chief Complaint(s) and History of Present Illness(es)     Follow Up               Comments     Pt states that his vision has slightly diminished but that it is mostly the same. Pt denies other concerns.    Pt denies floaters, flashes, distortion, double vision, or pain in either eye.  Dalton Whitt OT 9:10 AM March 1, 2022

## 2022-03-07 ENCOUNTER — OFFICE VISIT (OUTPATIENT)
Dept: OPHTHALMOLOGY | Facility: CLINIC | Age: 32
End: 2022-03-07
Attending: OPHTHALMOLOGY
Payer: COMMERCIAL

## 2022-03-07 DIAGNOSIS — Q15.0 OAG (OPEN ANGLE GLAUCOMA), JUVENILE: Primary | ICD-10-CM

## 2022-03-07 DIAGNOSIS — H52.12 MYOPIA OF LEFT EYE: ICD-10-CM

## 2022-03-07 PROCEDURE — 99212 OFFICE O/P EST SF 10 MIN: CPT | Performed by: OPHTHALMOLOGY

## 2022-03-07 PROCEDURE — G0463 HOSPITAL OUTPT CLINIC VISIT: HCPCS

## 2022-03-07 ASSESSMENT — SLIT LAMP EXAM - LIDS
COMMENTS: NORMAL
COMMENTS: NORMAL

## 2022-03-07 ASSESSMENT — TONOMETRY
OS_IOP_MMHG: 18
IOP_METHOD: APPLANATION
OD_IOP_MMHG: 19

## 2022-03-07 ASSESSMENT — CUP TO DISC RATIO
OS_RATIO: 0.8
OD_RATIO: 0.3

## 2022-03-07 ASSESSMENT — VISUAL ACUITY
METHOD: SNELLEN - LINEAR
OS_SC: 20/125
OD_SC: 20/20

## 2022-03-07 ASSESSMENT — EXTERNAL EXAM - RIGHT EYE: OD_EXAM: NORMAL

## 2022-03-07 ASSESSMENT — EXTERNAL EXAM - LEFT EYE: OS_EXAM: NORMAL

## 2022-03-07 ASSESSMENT — CONF VISUAL FIELD
OS_NORMAL: 1
OD_NORMAL: 1
METHOD: COUNTING FINGERS

## 2022-03-07 NOTE — PROGRESS NOTES
Silviano Edward is a 30 year old patient who presents for evaluation of glaucoma. Patient was referred to glaucoma clinic by Dr. Morel for evaluation of glaucoma. He was evaluated by Dr. Morel on 5/5/21 as a glaucoma referral by an outside clinic; he had originally been referred to any eye provider because he was unable to renew his [place] drivers license. He had not had an eye exam prior to this. The outside eye provider ([facility]) found IOP 16/42 mmHg and started him on Timolol 2x/day and Latanoprost at bedtime in the left eye; but he stopped both of these prior to his visit with Dr. Morel due to redness and itching. Patient was evaluated by Neuro-Ophthalmology due to the asymmetry of his disease. After a thorough workup, including MRI brain and orbits, it was ultimately decided his vision loss was related to glaucoma.     Chief Complaint/Presenting Concern: Glaucoma follow up     History of Present Illness:   Silviano Edward is a 30 year old patient who presents for glaucoma follow up. Started using Cosopt BID left eye 1 week ago after presenting with severe elevation in IOP. Reports good compliance for the past week.     Relevant Past Medical/Family/Social History: Healthy    Relevant Review of Systems: Weight gain 10-15 lbs since July 2021     Diagnosis: Juvenile Open Angle Glaucoma left eye   Glaucoma suspect right eye   Year diagnosis: 2021  Previous glaucoma surgery/laser: none  Maximum intraocular pressure 22/47 mmHg   Current Meds: Cosopt sporadically  Family history: negative  CCT: 491/469  Gonio: open to scleral spur 360 in each eye no vascular congestion, light pigmentation   Trauma history: negative  Steroid exposure: negative  Vasospastic disease: Migrane/Raynaud phenomenon: negative  A past hemodynamic crisis or Low BP: negative  Meds AEs/intolerance: ?Timolol and latanoprost (itchiness, redness)  PMHx: Negative for asthma and respiratory problems/Cardiac/Renal/Kidney stones/Sulfa  Allergy  Anticoagulants: None  Prior testing  Repeat Visual field July 13, 2021:   Right eye -early nasal defect?  Left eye LVC: dense superior arcuate defect    Today's testing:  IOP  19/18 mmHg  OCT Optic Nerve RNFL Spectralis  right eye: healthy RNFL and GCL  left eye: diffuse RNFL thinning, loss of GLC    Additional Diagnosis:     2. Mild Myopia left eye     3. Weight gain, hair loss, constipation  Recommended reach out to PCP for work up including thyroid    Plan/Recommendations:    Discussed findings with patient. IOP improved on Cosopt. Will aim for IOP <15mmHg given advanced damage and young age. If not on target next visit consider SLT.     Continue Cosopt BID in the left eye    Discussed it is unlikely cosopt is contributing to patient's weight gain, recommended systemic work up (thyroid labs, etc) with PCP for recent symptoms including weight gain, hair loss, constipation     RTC 4 weeks VA, IOP       Physician Attestation     Attending Physician Attestation:  Complete documentation of historical and exam elements from today's encounter can be found in the full encounter summary report (not reduplicated in this progress note). I personally obtained the chief complaint(s) and history of present illness. I confirmed and edited as necessary the review of systems, past medical/surgical history, family history, social history, and examination findings as documented by others; and I examined the patient myself. I personally reviewed the relevant tests, images, and reports as documented above. I formulated and edited as necessary the assessment and plan and discussed the findings and management plan with the patient and any family members present at the time of the visit.  Eri Yusuf M.D., Glaucoma, March 7, 2022

## 2022-03-07 NOTE — NURSING NOTE
Chief Complaints and History of Present Illnesses   Patient presents with     Glaucoma Follow-Up     POAG one week follow up.     Chief Complaint(s) and History of Present Illness(es)     Glaucoma Follow-Up     Laterality: left eye    Associated symptoms: Negative for redness, headache, fever, discharge and itching    Treatment side effects: none    Compliance with Treatment: always    Comments: POAG one week follow up.              Comments     Restarted Cosopt BID / LD @ 9 am today  Brimonidine BID / LD @ 8:10 am today.  Pt denies side effects from drops.  Alicia Brothers, MONICO COT 11:08 AM 03/07/2022                    
 delivery delivered

## 2022-03-31 ENCOUNTER — TELEPHONE (OUTPATIENT)
Dept: OPHTHALMOLOGY | Facility: CLINIC | Age: 32
End: 2022-03-31
Payer: COMMERCIAL

## 2022-03-31 NOTE — TELEPHONE ENCOUNTER
Dr. Yusuf has been called into surgery next Friday and we need to reschedule your possible laser appointment. If 9:30 is a good time, there is availability the next Friday, or a lot of openings moving forward. Please call me back to let me know. [phone]    MAXIMUS TEJEDA 2:52 PM March 31, 2022

## 2022-05-20 ENCOUNTER — OFFICE VISIT (OUTPATIENT)
Dept: OPHTHALMOLOGY | Facility: CLINIC | Age: 32
End: 2022-05-20
Attending: OPHTHALMOLOGY
Payer: COMMERCIAL

## 2022-05-20 DIAGNOSIS — Q15.0 OAG (OPEN ANGLE GLAUCOMA), JUVENILE: ICD-10-CM

## 2022-05-20 DIAGNOSIS — H40.002 GLAUCOMA SUSPECT, LEFT: Primary | ICD-10-CM

## 2022-05-20 PROCEDURE — 65855 TRABECULOPLASTY LASER SURG: CPT | Mod: LT | Performed by: OPHTHALMOLOGY

## 2022-05-20 PROCEDURE — G0463 HOSPITAL OUTPT CLINIC VISIT: HCPCS | Mod: 25

## 2022-05-20 PROCEDURE — 250N000009 HC RX 250: Performed by: OPHTHALMOLOGY

## 2022-05-20 RX ORDER — PREDNISOLONE ACETATE 10 MG/ML
1-2 SUSPENSION/ DROPS OPHTHALMIC 3 TIMES DAILY
Qty: 5 ML | Refills: 0 | Status: SHIPPED | OUTPATIENT
Start: 2022-05-20 | End: 2022-05-23

## 2022-05-20 RX ADMIN — APRACLONIDINE HYDROCHLORIDE 1 DROP: 10 SOLUTION/ DROPS OPHTHALMIC at 10:20

## 2022-05-20 ASSESSMENT — VISUAL ACUITY
OS_PH_SC+: -1
OS_SC: 20/80
OS_PH_SC: 20/60
METHOD: SNELLEN - LINEAR
OD_SC: 20/20
OS_SC+: -2

## 2022-05-20 ASSESSMENT — CONF VISUAL FIELD
OS_NORMAL: 1
OD_NORMAL: 1

## 2022-05-20 ASSESSMENT — TONOMETRY
OD_IOP_MMHG: 17
OS_IOP_MMHG: 23
IOP_METHOD: APPLANATION

## 2022-05-20 NOTE — PROGRESS NOTES
Silviano Edward is a 30 year old patient who presents for evaluation of glaucoma. Patient was referred to glaucoma clinic by Dr. Morel for evaluation of glaucoma. He was evaluated by Dr. Morel on 5/5/21 as a glaucoma referral by an outside clinic; he had originally been referred to any eye provider because he was unable to renew his [place] drivers license. He had not had an eye exam prior to this. The outside eye provider ([facility]) found IOP 16/42 mmHg and started him on Timolol 2x/day and Latanoprost at bedtime in the left eye; but he stopped both of these prior to his visit with Dr. Morel due to redness and itching. Patient was evaluated by Neuro-Ophthalmology due to the asymmetry of his disease. After a thorough workup, including MRI brain and orbits, it was ultimately decided his vision loss was related to glaucoma.     Chief Complaint/Presenting Concern: Glaucoma follow up     History of Present Illness:   Silviano Edward is a 30 year old patient who presents for glaucoma follow up. Started using Cosopt BID left eye. Reports good compliance and tolerating well.     Relevant Past Medical/Family/Social History: Healthy    Relevant Review of Systems: Weight gain 10-15 lbs since July 2021     Diagnosis: Juvenile Open Angle Glaucoma left eye   Glaucoma suspect right eye   Year diagnosis: 2021  Previous glaucoma surgery/laser: none  Maximum intraocular pressure 22/47 mmHg   Current Meds: Cosopt sporadically  Family history: negative  CCT: 491/469  Gonio: open to scleral spur 360 in each eye no vascular congestion, light pigmentation   Trauma history: negative  Steroid exposure: negative  Vasospastic disease: Migrane/Raynaud phenomenon: negative  A past hemodynamic crisis or Low BP: negative  Meds AEs/intolerance: ?Timolol and latanoprost (itchiness, redness)  PMHx: Negative for asthma and respiratory problems/Cardiac/Renal/Kidney stones/Sulfa Allergy  Anticoagulants: None  Prior testing  Repeat Visual field July  13, 2021:   Right eye -early nasal defect?  Left eye LVC: dense superior arcuate defect  OCT Optic Nerve RNFL Spectralis  right eye: healthy RNFL and GCL  left eye: diffuse RNFL thinning, loss of GLC    Today:  SLT performed to the left eye with no complications (refer to procedure note), IOP checked 1 hour after was 19 mmHg       Additional Diagnosis:     2. Mild Myopia left eye     3. Weight gain, hair loss, constipation  Recommended reach out to PCP for work up including thyroid    Plan/Recommendations:    Discussed findings with patient. IOP improved on Cosopt. Will aim for IOP <15mmHg given advanced damage and young age    Risk and benefit of SLT procedure in the left eye were discussed with the patient including the risk of IOP spike. Patient agrees to proceed with procedure.     SLT performed today with no complications     Start Prednisolone TID left eye for 3 days     Continue Cosopt BID in the left eye    RTC 1 weeks VA, IOP         Physician Attestation     Attending Physician Attestation:  Complete documentation of historical and exam elements from today's encounter can be found in the full encounter summary report (not reduplicated in this progress note). I personally obtained the chief complaint(s) and history of present illness. I confirmed and edited as necessary the review of systems, past medical/surgical history, family history, social history, and examination findings as documented by others; and I examined the patient myself. I personally reviewed the relevant tests, images, and reports as documented above. I formulated and edited as necessary the assessment and plan and discussed the findings and management plan with the patient and any family members present at the time of the visit.  Eri Yusuf M.D., Glaucoma, May 22, 2022

## 2022-05-20 NOTE — NURSING NOTE
Chief Complaints and History of Present Illnesses   Patient presents with     Glaucoma Follow-Up     Pt here for 8 week follow up on OAG Juvenile left eye and possible SLT left eye.     Chief Complaint(s) and History of Present Illness(es)     Glaucoma Follow-Up     Laterality: left eye    Associated symptoms: Negative for eye pain, headache, flashes and floaters    Comments: Pt here for 8 week follow up on OAG Juvenile left eye and possible SLT left eye.              Comments     Pt vision is stable since last exam. Pt has no new concerns. Pt endorses compliance with drops.   Pt using:  Brimonidine BID left eye   Cosopt BID in the left eye  ALDAIR SANCHEZ 9:31 AM May 20, 2022

## 2022-05-20 NOTE — PATIENT INSTRUCTIONS
Use Prednisolone in the left eye three times daily for three days    Continue cosopt 1 drop 2 times per day in the left eye (12 hours apart)    Continue brimonidine 1 drop 2 times per day in the left eye (12 hours aprt)

## 2022-05-22 ASSESSMENT — EXTERNAL EXAM - RIGHT EYE: OD_EXAM: NORMAL

## 2022-05-22 ASSESSMENT — CUP TO DISC RATIO
OS_RATIO: 0.8
OD_RATIO: 0.3

## 2022-05-22 ASSESSMENT — EXTERNAL EXAM - LEFT EYE: OS_EXAM: NORMAL

## 2022-05-22 ASSESSMENT — SLIT LAMP EXAM - LIDS
COMMENTS: NORMAL
COMMENTS: NORMAL

## 2022-06-01 ENCOUNTER — TELEPHONE (OUTPATIENT)
Dept: OPHTHALMOLOGY | Facility: CLINIC | Age: 32
End: 2022-06-01
Payer: COMMERCIAL

## 2022-06-01 NOTE — TELEPHONE ENCOUNTER
Called and spoke to Silviano     Made him an appointment for 6/9 @ 745 am for post-op per pt.     Zuly is this ok?     Thanks     Temitope

## 2022-06-01 NOTE — TELEPHONE ENCOUNTER
[facility] Call Center    Phone Message    May a detailed message be left on voicemail: yes     Reason for Call: Other: pt called because he missed his appt yesterday. Wanted to reschedule as its a recheck post surgical. Soonest available for writer is 7/28. Please call pt back to schedule. Thanks.      Action Taken: Message routed to:  [facility] ([facility] EYE    Travel Screening: Not Applicable

## 2022-06-09 ENCOUNTER — OFFICE VISIT (OUTPATIENT)
Dept: OPHTHALMOLOGY | Facility: CLINIC | Age: 32
End: 2022-06-09
Payer: COMMERCIAL

## 2022-06-09 DIAGNOSIS — Q15.0 OAG (OPEN ANGLE GLAUCOMA), JUVENILE: Primary | ICD-10-CM

## 2022-06-09 DIAGNOSIS — H52.12 MYOPIA OF LEFT EYE: ICD-10-CM

## 2022-06-09 DIAGNOSIS — Z98.890 STATUS POST LASER TRABECULOPLASTY OF EYE: ICD-10-CM

## 2022-06-09 PROCEDURE — G0463 HOSPITAL OUTPT CLINIC VISIT: HCPCS

## 2022-06-09 PROCEDURE — 99213 OFFICE O/P EST LOW 20 MIN: CPT | Performed by: OPHTHALMOLOGY

## 2022-06-09 ASSESSMENT — TONOMETRY
IOP_METHOD: APPLANATION
OS_IOP_MMHG: 20
IOP_METHOD: APPLANATION
OD_IOP_MMHG: 11
OS_IOP_MMHG: 12
OD_IOP_MMHG: 16

## 2022-06-09 ASSESSMENT — VISUAL ACUITY
OS_PH_SC+: -1
OS_SC: 20/100
OD_SC: 20/20
OS_PH_SC: 20/70
METHOD: SNELLEN - LINEAR

## 2022-06-09 ASSESSMENT — SLIT LAMP EXAM - LIDS
COMMENTS: NORMAL
COMMENTS: NORMAL

## 2022-06-09 ASSESSMENT — CONF VISUAL FIELD
OD_NORMAL: 1
METHOD: COUNTING FINGERS
OS_NORMAL: 1

## 2022-06-09 ASSESSMENT — EXTERNAL EXAM - LEFT EYE: OS_EXAM: NORMAL

## 2022-06-09 ASSESSMENT — EXTERNAL EXAM - RIGHT EYE: OD_EXAM: NORMAL

## 2022-06-09 NOTE — PROGRESS NOTES
Silviano Edward is a 30 year old patient who presents for evaluation of glaucoma. Patient was referred to glaucoma clinic by Dr. Morel for evaluation of glaucoma. He was evaluated by Dr. Morel on 5/5/21 as a glaucoma referral by an outside clinic; he had originally been referred to any eye provider because he was unable to renew his [place] drivers license. He had not had an eye exam prior to this. The outside eye provider ([facility]) found IOP 16/42 mmHg and started him on Timolol 2x/day and Latanoprost at bedtime in the left eye; but he stopped both of these prior to his visit with Dr. Morel due to redness and itching. Patient was evaluated by Neuro-Ophthalmology due to the asymmetry of his disease. After a thorough workup, including MRI brain and orbits, it was ultimately decided his vision loss was related to glaucoma.     Chief Complaint/Presenting Concern: Glaucoma follow up, s/p SLT left eye     History of Present Illness:   Silviano Edward is a 30 year old patient who presents for glaucoma follow up, POW 3 s/p SLT left eye 05/20/22. using Cosopt BID left eye. Reports good compliance and tolerating well.     Relevant Past Medical/Family/Social History: Healthy    Relevant Review of Systems: Weight gain 10-15 lbs since July 2021     Diagnosis: Juvenile Open Angle Glaucoma left eye   Glaucoma suspect right eye   Year diagnosis: 2021  Previous glaucoma surgery/laser: none  Maximum intraocular pressure 22/47 mmHg   Current Meds: Cosopt sporadically  Family history: negative  CCT: 491/469  Gonio: open to scleral spur 360 in each eye no vascular congestion, light pigmentation   Trauma history: negative  Steroid exposure: negative  Vasospastic disease: Migrane/Raynaud phenomenon: negative  A past hemodynamic crisis or Low BP: negative  Meds AEs/intolerance: ?Timolol and latanoprost (itchiness, redness)  PMHx: Negative for asthma and respiratory problems/Cardiac/Renal/Kidney stones/Sulfa Allergy  Anticoagulants:  None  Prior testing  Repeat Visual field July 13, 2021:   Right eye -early nasal defect?  Left eye LVC: dense superior arcuate defect  OCT Optic Nerve RNFL Spectralis  right eye: healthy RNFL and GCL  left eye: diffuse RNFL thinning, loss of GLC    Today:  POW3 s/p SLT left eye   IOP 11/12 mmHg     Additional Diagnosis:     2. Mild Myopia left eye     3. Weight gain, hair loss, constipation  Recommended reach out to PCP for work up including thyroid    Plan/Recommendations:    Discussed findings with patient. IOP improved on Cosopt. Will aim for IOP <15mmHg given advanced damage and young age    POW3 s/p SLT with good response, will recheck in 2 months for full effect.     Continue Cosopt BID in the left eye    RTC 6 weeks VA, IOP       Physician Attestation     Attending Physician Attestation:  Complete documentation of historical and exam elements from today's encounter can be found in the full encounter summary report (not reduplicated in this progress note). I personally obtained the chief complaint(s) and history of present illness. I confirmed and edited as necessary the review of systems, past medical/surgical history, family history, social history, and examination findings as documented by others; and I examined the patient myself. I personally reviewed the relevant tests, images, and reports as documented above. I formulated and edited as necessary the assessment and plan and discussed the findings and management plan with the patient and any family members present at the time of the visit.  Eri Yusuf M.D., Glaucoma, June 9, 2022

## 2022-06-09 NOTE — NURSING NOTE
Chief Complaints and History of Present Illnesses   Patient presents with     Follow Up     SLT Left eye 5/20/22     Chief Complaint(s) and History of Present Illness(es)     Follow Up     Comments: SLT Left eye 5/20/22              Comments     Pt states no change in VA since last visit  Pt states no redness, headaches, eye pain or itching  Using:  Cosopt BID in the left eye  Brimonidine BID left eye     Daiana Mast COT 7:49 AM June 9, 2022

## 2022-08-31 ENCOUNTER — TELEPHONE (OUTPATIENT)
Dept: OPHTHALMOLOGY | Facility: CLINIC | Age: 32
End: 2022-08-31

## 2022-09-20 ENCOUNTER — OFFICE VISIT (OUTPATIENT)
Dept: OPHTHALMOLOGY | Facility: CLINIC | Age: 32
End: 2022-09-20
Attending: OPHTHALMOLOGY
Payer: COMMERCIAL

## 2022-09-20 DIAGNOSIS — Q15.0 OAG (OPEN ANGLE GLAUCOMA), JUVENILE: ICD-10-CM

## 2022-09-20 PROCEDURE — 92012 INTRM OPH EXAM EST PATIENT: CPT | Performed by: OPHTHALMOLOGY

## 2022-09-20 PROCEDURE — G0463 HOSPITAL OUTPT CLINIC VISIT: HCPCS

## 2022-09-20 RX ORDER — DORZOLAMIDE HYDROCHLORIDE AND TIMOLOL MALEATE 20; 5 MG/ML; MG/ML
1 SOLUTION/ DROPS OPHTHALMIC 2 TIMES DAILY
Qty: 10 ML | Refills: 11 | Status: SHIPPED | OUTPATIENT
Start: 2022-09-20 | End: 2023-10-16

## 2022-09-20 RX ORDER — BRIMONIDINE TARTRATE 2 MG/ML
1 SOLUTION/ DROPS OPHTHALMIC EVERY 12 HOURS
Qty: 5 ML | Refills: 11 | Status: SHIPPED | OUTPATIENT
Start: 2022-09-20

## 2022-09-20 ASSESSMENT — VISUAL ACUITY
OS_PH_SC: 20/70
OD_SC: 20/20
OS_SC: 20/70
OS_SC+: -2
METHOD: SNELLEN - LINEAR

## 2022-09-20 ASSESSMENT — TONOMETRY
OD_IOP_MMHG: 16
OS_IOP_MMHG: 14
OD_IOP_MMHG: 14
OS_IOP_MMHG: 12
IOP_METHOD: APPLANATION

## 2022-09-20 ASSESSMENT — SLIT LAMP EXAM - LIDS
COMMENTS: NORMAL
COMMENTS: NORMAL

## 2022-09-20 ASSESSMENT — CONF VISUAL FIELD
OD_NORMAL: 1
OS_NORMAL: 1

## 2022-09-20 ASSESSMENT — EXTERNAL EXAM - RIGHT EYE: OD_EXAM: NORMAL

## 2022-09-20 ASSESSMENT — EXTERNAL EXAM - LEFT EYE: OS_EXAM: NORMAL

## 2022-09-20 NOTE — NURSING NOTE
Chief Complaints and History of Present Illnesses   Patient presents with     Glaucoma Follow-Up     6 week follow up for Juvenile Open Angle Glaucoma left eye and Glaucoma suspect right eye.   Patient reports vision is stable each eye in the last few weeks. Patient states compliant with glaucoma drops.      Chief Complaint(s) and History of Present Illness(es)     Glaucoma Follow-Up     Laterality: left eye    Associated symptoms: Negative for eye pain, redness and headache    Compliance with Treatment: always    Pain scale: 0/10    Comments: 6 week follow up for Juvenile Open Angle Glaucoma left eye and Glaucoma suspect right eye.   Patient reports vision is stable each eye in the last few weeks. Patient states compliant with glaucoma drops.               Comments     Ocular meds:   - Dorzolamide/Timolol BID left eye, last used between 11:30-12 today   - Brimonidine BID left eye, last used between 11:30-12 today     MONICO Maria 2:58 PM 09/20/2022

## 2022-09-20 NOTE — PATIENT INSTRUCTIONS
Continue cosopt 1 drop 2 times per day in the left eye (12 hours apart)    Continue brimonidine 1 drop 2 times per day in the left eye (12 hours aprt)    Return to clinic in 3 months

## 2022-09-20 NOTE — PROGRESS NOTES
Patient was evaluated by Dr. Morel on 5/5/21 as a glaucoma referral by an outside clinic; he had originally been referred to any eye provider because he was unable to renew his [place] drivers license. He had not had an eye exam prior to this. The outside eye provider ([facility]) found IOP 16/42 mmHg and started him on Timolol 2x/day and Latanoprost at bedtime in the left eye; but he stopped both of these prior to his visit with Dr. Morel due to redness and itching. Patient was evaluated by Neuro-Ophthalmology due to the asymmetry of his disease. After a thorough workup, including MRI brain and orbits, it was ultimately decided his vision loss was related to glaucoma.     Chief Complaint/Presenting Concern: Glaucoma follow up, s/p SLT left eye     History of Present Illness:   Silviano Edward is a 31 year old patient who presents for glaucoma follow up, POM4 s/p SLT left eye 05/20/22. using Cosopt BID left eye and brimonidine BID OS. States he misses his evening dose a few times per week.     Relevant Past Medical/Family/Social History: Healthy    Relevant Review of Systems: Weight gain 10-15 lbs since July 2021     Diagnosis: Juvenile Open Angle Glaucoma left eye   Glaucoma suspect right eye   Year diagnosis: 2021  Previous glaucoma surgery/laser: none  Maximum intraocular pressure 22/47 mmHg   Current Meds: Cosopt sporadically  Family history: negative  CCT: 491/469  Gonio: open to scleral spur 360 in each eye no vascular congestion, light pigmentation   Trauma history: negative  Steroid exposure: negative  Vasospastic disease: Migrane/Raynaud phenomenon: negative  A past hemodynamic crisis or Low BP: negative  Meds AEs/intolerance: ?Timolol and ?latanoprost (itchiness, redness)  PMHx: Negative for asthma and respiratory problems/Cardiac/Renal/Kidney stones/Sulfa Allergy  Anticoagulants: None  Prior testing  Repeat Visual field July 13, 2021:   Right eye -early nasal defect?  Left eye LVC: dense superior  arcuate defect  OCT Optic Nerve RNFL Spectralis  right eye: healthy RNFL and GCL  left eye: diffuse RNFL thinning, loss of GLC    Today:  POM4 s/p SLT left eye   IOP 14/14 mmHg     Additional Diagnosis:     2. Mild Myopia left eye     3. Weight gain, hair loss, constipation  - States his thyroid workup was normal at complete H&P    Plan/Recommendations:    Discussed findings with patient. IOP improved on Cosopt and brimonidine BID OS. Will aim for IOP <15mmHg given advanced damage and young age from JOAG OS. At goal presently.    POM4 s/p SLT with good response, now in full effect.    Continue Cosopt BID in the left eye and brimonidine BID OS     RTC 3 months weeks VA, IOP, OCT RNFL, VF    Fay Barajas MD  Resident Physician - PGY3  Department of Ophthalmology   [facility]      Physician Attestation     Attending Physician Attestation:  Complete documentation of historical and exam elements from today's encounter can be found in the full encounter summary report (not reduplicated in this progress note). I personally obtained the chief complaint(s) and history of present illness. I confirmed and edited as necessary the review of systems, past medical/surgical history, family history, social history, and examination findings as documented by others; and I examined the patient myself. I personally reviewed the relevant tests, images, and reports as documented above. I formulated and edited as necessary the assessment and plan and discussed the findings and management plan with the patient and any family members present at the time of the visit.  Eri Yusuf M.D., Glaucoma, September 20, 2022

## 2022-12-19 DIAGNOSIS — Q15.0 OAG (OPEN ANGLE GLAUCOMA), JUVENILE: Primary | ICD-10-CM

## 2023-10-09 DIAGNOSIS — Q15.0 OAG (OPEN ANGLE GLAUCOMA), JUVENILE: Primary | ICD-10-CM

## 2023-10-16 DIAGNOSIS — Q15.0 OAG (OPEN ANGLE GLAUCOMA), JUVENILE: ICD-10-CM

## 2023-10-16 RX ORDER — DORZOLAMIDE HYDROCHLORIDE AND TIMOLOL MALEATE 20; 5 MG/ML; MG/ML
1 SOLUTION/ DROPS OPHTHALMIC 2 TIMES DAILY
Qty: 10 ML | Refills: 11 | Status: SHIPPED | OUTPATIENT
Start: 2023-10-16 | End: 2024-05-28

## 2023-10-16 NOTE — TELEPHONE ENCOUNTER
dorzolamide-timolol (COSOPT) 22.3-6.8 MG/ML ophthalmic solution     Last Written Prescription Date:   9/20/2022  Last Fill Quantity: 10,   # refills: 11  Last Office Visit :  9/20/2022  Future Office visit:  1/25/2024  Plan/Recommendations:  Discussed findings with patient. IOP improved on Cosopt and brimonidine BID OS. Will aim for IOP <15mmHg given advanced damage and young age from JOAG OS. At goal presently.  POM4 s/p SLT with good response, now in full effect.  Continue Cosopt BID in the left eye and brimonidine BID OS      RTC 3 months weeks VA, IOP, OCT RNFL, VF     Fay Barajas MD  Resident Physician - PGY3  Department of Ophthalmology   [facility]       Eri Yusuf MD  Ophthalmology       10 mL, 11 Refills sent to dia Murillo RN  Central Triage Red Flags/Med Refills

## 2024-01-24 DIAGNOSIS — Q15.0 OAG (OPEN ANGLE GLAUCOMA), JUVENILE: Primary | ICD-10-CM

## 2024-04-05 DIAGNOSIS — Q15.0 OAG (OPEN ANGLE GLAUCOMA), JUVENILE: Primary | ICD-10-CM

## 2024-05-28 ENCOUNTER — OFFICE VISIT (OUTPATIENT)
Dept: OPHTHALMOLOGY | Facility: CLINIC | Age: 34
End: 2024-05-28
Attending: OPHTHALMOLOGY
Payer: COMMERCIAL

## 2024-05-28 DIAGNOSIS — Q15.0 OAG (OPEN ANGLE GLAUCOMA), JUVENILE: ICD-10-CM

## 2024-05-28 PROCEDURE — 99211 OFF/OP EST MAY X REQ PHY/QHP: CPT | Performed by: OPHTHALMOLOGY

## 2024-05-28 PROCEDURE — 92083 EXTENDED VISUAL FIELD XM: CPT | Performed by: OPHTHALMOLOGY

## 2024-05-28 PROCEDURE — 99214 OFFICE O/P EST MOD 30 MIN: CPT | Mod: GC | Performed by: OPHTHALMOLOGY

## 2024-05-28 PROCEDURE — 92133 CPTRZD OPH DX IMG PST SGM ON: CPT | Performed by: OPHTHALMOLOGY

## 2024-05-28 RX ORDER — DORZOLAMIDE HYDROCHLORIDE AND TIMOLOL MALEATE 20; 5 MG/ML; MG/ML
1 SOLUTION/ DROPS OPHTHALMIC 2 TIMES DAILY
Qty: 10 ML | Refills: 11 | Status: SHIPPED | OUTPATIENT
Start: 2024-05-28

## 2024-05-28 RX ORDER — LATANOPROST 50 UG/ML
1 SOLUTION/ DROPS OPHTHALMIC DAILY
Qty: 7.5 ML | Refills: 11 | Status: SHIPPED | OUTPATIENT
Start: 2024-05-28

## 2024-05-28 ASSESSMENT — VISUAL ACUITY
OD_SC: 20/15-
OS_SC: 20/125-
METHOD: SNELLEN - LINEAR
OS_PH_SC: 20/70-2

## 2024-05-28 ASSESSMENT — CUP TO DISC RATIO
OS_RATIO: 0.8
OD_RATIO: 0.3

## 2024-05-28 ASSESSMENT — SLIT LAMP EXAM - LIDS
COMMENTS: NORMAL
COMMENTS: NORMAL

## 2024-05-28 ASSESSMENT — TONOMETRY
OD_IOP_MMHG: 17
OS_IOP_MMHG: 26
OS_IOP_MMHG: 22
IOP_METHOD: APPLANATION
IOP_METHOD: TONOPEN
OD_IOP_MMHG: 17

## 2024-05-28 ASSESSMENT — REFRACTION_MANIFEST
OS_SPHERE: -1.50
OS_CYLINDER: +0.75
OS_AXIS: 164

## 2024-05-28 ASSESSMENT — EXTERNAL EXAM - RIGHT EYE: OD_EXAM: NORMAL

## 2024-05-28 ASSESSMENT — EXTERNAL EXAM - LEFT EYE: OS_EXAM: NORMAL

## 2024-05-28 NOTE — PATIENT INSTRUCTIONS
Start using latanoprost, in the left eye, at bedtime    Restart the cosopt, twice a day, in the left eye    Stop the brimonidine    Come back in 4-6 weeks, and we will check your eye pressure and also perform the laser procedure (selective laser trabeculoplasty) in the left eye

## 2024-05-28 NOTE — PROGRESS NOTES
Patient was evaluated by Dr. Morel on 5/5/21 as a glaucoma referral by an outside clinic; he had originally been referred to any eye provider because he was unable to renew his [place] drivers license. He had not had an eye exam prior to this. The outside eye provider ([facility]) found IOP 16/42 mmHg and started him on Timolol 2x/day and Latanoprost at bedtime in the left eye; but he stopped both of these prior to his visit with Dr. Morel due to redness and itching. Patient was evaluated by Neuro-Ophthalmology due to the asymmetry of his disease. After a thorough workup, including MRI brain and orbits, it was ultimately decided his vision loss was related to glaucoma.     Chief Complaint/Presenting Concern: Glaucoma follow up, s/p SLT left eye     History of Present Illness:   Silviano Edward is a 31 year old patient who presents for glaucoma follow up. Previously had SLT of the left eye on 5/20/22 with good response. Lost to follow-up until now, 1.5 years later.   Patient was on cosopt and brimonidine both twice a day. Around 2 months ago, the drops were causing double vision, blurry vision, itchiness. Because of this, he stopped using eye drops.     Relevant Past Medical/Family/Social History: Healthy    Relevant Review of Systems: Weight gain 10-15 lbs since July 2021     Diagnosis: Juvenile Open Angle Glaucoma left eye   Glaucoma suspect right eye   Year diagnosis: 2021  Previous glaucoma surgery/laser:   SLT left eye (5/20/22)  Maximum intraocular pressure 22/47 mmHg   Current Meds: Cosopt sporadically  Family history: negative  CCT: 491/469  Gonio: open to scleral spur 360 in each eye no vascular congestion, light pigmentation   Trauma history: negative  Steroid exposure: negative  Vasospastic disease: Migrane/Raynaud phenomenon: negative  A past hemodynamic crisis or Low BP: negative  Meds AEs/intolerance: ?Timolol and ?latanoprost (itchiness, redness)  PMHx: Negative for asthma and respiratory  problems/Cardiac/Renal/Kidney stones/Sulfa Allergy  Anticoagulants: None    Today testing  OCT Optic Nerve RNFL Spectralis May 28, 2024  right eye: healthy RNFL and GCL, stable from prior  left eye: diffuse RNFL thinning, loss of GLC, stable from prior  Visual field May 28, 2024  Right eye: full field, possible early nasal defect not seen today  Left eye LVC: dense superior arcuate defect, possible progression nasally    Today:  IOP 17/26 mmHg     Additional Diagnosis:     2. Mild Myopia left eye     3. Weight gain, hair loss, constipation  - States his thyroid workup was normal at complete H&P    Plan/Recommendations:  Discussed findings with patient. IOP is elevated at 17 and 26 in both eyes. The RNFL is baseline thin and not a good gauge of progression. Possible new progression nasally on visual field and centrally.  Will aim for IOP <15mmHg given advanced damage and young age from JOAG OS.  Good response to SLT in the past, would recommend repeating.  Possible that he developed brimonidine allergy, and that explains his discomfort with drops. Will ask him to restart drops, and substitute brimonidine for latanoprost.   Restart cosopt, BID, left eye  Start latanoprost, at bedtime, left eye  Okay to monitor right eye without drops.     RTC 4-6 weeks for VA, IOP check, and SLT left eye.     Robson Gomes MD  Resident Physician - PGY3  Department of Ophthalmology   [facility]      Physician Attestation     Attending Physician Attestation:  Complete documentation of historical and exam elements from today's encounter can be found in the full encounter summary report (not reduplicated in this progress note). I personally obtained the chief complaint(s) and history of present illness. I confirmed and edited as necessary the review of systems, past medical/surgical history, family history, social history, and examination findings as documented by others; and I examined the patient myself. I personally  reviewed the relevant tests, images, and reports as documented above. I personally reviewed the ophthalmic test(s) associated with this encounter, agree with the interpretation(s) as documented by the resident/fellow and have edited the corresponding report(s) as necessary. I formulated and edited as necessary the assessment and plan and discussed the findings and management plan with the patient and any family members present at the time of the visit.  Eri Yusuf M.D., Glaucoma, May 28, 2024

## 2024-08-09 ENCOUNTER — OFFICE VISIT (OUTPATIENT)
Dept: OPHTHALMOLOGY | Facility: CLINIC | Age: 34
End: 2024-08-09
Attending: OPHTHALMOLOGY
Payer: COMMERCIAL

## 2024-08-09 DIAGNOSIS — Q15.0 OAG (OPEN ANGLE GLAUCOMA), JUVENILE: Primary | ICD-10-CM

## 2024-08-09 PROCEDURE — 92012 INTRM OPH EXAM EST PATIENT: CPT | Mod: GC | Performed by: OPHTHALMOLOGY

## 2024-08-09 PROCEDURE — 99213 OFFICE O/P EST LOW 20 MIN: CPT | Performed by: OPHTHALMOLOGY

## 2024-08-09 ASSESSMENT — VISUAL ACUITY
OD_SC: 20/15
OD_SC+: -1
OS_SC: 20/100
METHOD: SNELLEN - LINEAR
OS_PH_SC: 20/50
OS_PH_SC+: -1
OS_SC+: -1+1

## 2024-08-09 ASSESSMENT — TONOMETRY
OD_IOP_MMHG: 15
IOP_METHOD: TONOPEN
IOP_METHOD: APPLANATION
OS_IOP_MMHG: 15
OS_IOP_MMHG: 13
OD_IOP_MMHG: 12

## 2024-08-09 ASSESSMENT — EXTERNAL EXAM - RIGHT EYE: OD_EXAM: NORMAL

## 2024-08-09 ASSESSMENT — EXTERNAL EXAM - LEFT EYE: OS_EXAM: NORMAL

## 2024-08-09 ASSESSMENT — SLIT LAMP EXAM - LIDS
COMMENTS: NORMAL
COMMENTS: NORMAL

## 2024-08-09 NOTE — PROGRESS NOTES
Patient was evaluated by Dr. Morel on 5/5/21 as a glaucoma referral by an outside clinic; he had originally been referred to any eye provider because he was unable to renew his [place] drivers license. He had not had an eye exam prior to this. The outside eye provider ([facility]) found IOP 16/42 mmHg and started him on Timolol 2x/day and Latanoprost at bedtime in the left eye; but he stopped both of these prior to his visit with Dr. Morel due to redness and itching. Patient was evaluated by Neuro-Ophthalmology due to the asymmetry of his disease. After a thorough workup, including MRI brain and orbits, it was ultimately decided his vision loss was related to glaucoma.     Chief Complaint/Presenting Concern: Glaucoma follow up    History of Present Illness:   Silviano Edward is a 31 year old patient who presents for glaucoma follow up. Previously had SLT of the left eye on 5/20/22 with good response. Lost to follow-up until now, 1.5 years later.   Patient was on cosopt and brimonidine both twice a day. Around 2 months ago, the drops were causing double vision, blurry vision, itchiness. Because of this, he stopped using eye drops.     Relevant Past Medical/Family/Social History: Healthy    Relevant Review of Systems: Weight gain 10-15 lbs since July 2021     Diagnosis: Juvenile Open Angle Glaucoma left eye   Glaucoma suspect right eye   Year diagnosis: 2021  Previous glaucoma surgery/laser:   SLT left eye (5/20/22)  Maximum intraocular pressure 22/47 mmHg   Current Meds: Cosopt sporadically  Family history: negative  CCT: 491/469  Gonio: open to scleral spur 360 in each eye no vascular congestion, light pigmentation   Trauma history: negative  Steroid exposure: negative  Vasospastic disease: Migrane/Raynaud phenomenon: negative  A past hemodynamic crisis or Low BP: negative  Meds AEs/intolerance: ?Timolol and ?latanoprost (itchiness, redness)  PMHx: Negative for asthma and respiratory problems/Cardiac/Renal/Kidney  stones/Sulfa Allergy  Anticoagulants: None    Today testing  OCT Optic Nerve RNFL Spectralis May 28, 2024  right eye: healthy RNFL and GCL, stable from prior  left eye: diffuse RNFL thinning, loss of GLC, stable from prior  Visual field May 28, 2024  Right eye: full field, possible early nasal defect not seen today  Left eye LVC: dense superior arcuate defect, possible progression nasally    Today:  See examination    Additional Diagnosis:     2. Mild Myopia left eye     3. Weight gain, hair loss, constipation  - States his thyroid workup was normal at complete H&P    Plan/Recommendations:  Discussed findings with patient. IOP is elevated at 17 and 26 in both eyes. The RNFL is baseline thin and not a good gauge of progression. Possible new progression nasally on visual field and centrally.  Will aim for IOP <15mmHg given advanced damage and young age from JOAG OS.  Good response to SLT in the past, would recommend repeating.  Possible that he developed brimonidine allergy, and that explains his discomfort with drops. Will ask him to restart drops, and substitute brimonidine for latanoprost.   Good response after starting cosopt left eye. IOP 12/13. Will defer SLT at this time and continue with regimen  Continue cosopt, BID, left eye  Continue latanoprost, at bedtime, left eye; start in right eye to prevent asymmetry  Start AT's QID PRN    RTC 3 months VA, IOP, VF    Mikhail Combs MD  PGY-3 Ophthalmology Resident  [facility]      Physician Attestation     Attending Physician Attestation:  Complete documentation of historical and exam elements from today's encounter can be found in the full encounter summary report (not reduplicated in this progress note). I personally obtained the chief complaint(s) and history of present illness. I confirmed and edited as necessary the review of systems, past medical/surgical history, family history, social history, and examination findings as documented by others; and  I examined the patient myself. I personally reviewed the relevant tests, images, and reports as documented above. I formulated and edited as necessary the assessment and plan and discussed the findings and management plan with the patient and any family members present at the time of the visit.  Eri Yusuf M.D., Glaucoma, August 9, 2024

## 2024-08-09 NOTE — NURSING NOTE
Chief Complaints and History of Present Illnesses   Patient presents with    Glaucoma Follow-Up     10 week follow up Juvenile Open Angle Glaucoma left eye     Chief Complaint(s) and History of Present Illness(es)       Glaucoma Follow-Up              Associated symptoms: redness.  Negative for eye pain, flashes and floaters    Treatment side effects: redness    Pain scale: 0/10    Comments: 10 week follow up Juvenile Open Angle Glaucoma left eye              Comments    No VA changes since last visit.   No eye pain, or irritation.   Pt states since starting Latanaprost he's waking with redness of left eye     Ocular Meds:  Latanaprost qPM left eye   Cosopt BID left eye     Gonzales Ho 10:13 AM August 9, 2024

## 2024-08-19 DIAGNOSIS — Q15.0 OAG (OPEN ANGLE GLAUCOMA), JUVENILE: Primary | ICD-10-CM

## 2024-11-12 DIAGNOSIS — Q15.0 OAG (OPEN ANGLE GLAUCOMA), JUVENILE: Primary | ICD-10-CM

## 2024-11-26 NOTE — PATIENT INSTRUCTIONS
Restart cosopt 1 drop 2 times per day in the left eye (12 hours apart)    Start brimonidine 1 drop 2 times per day in the left eye (12 hours aprt)    Come back next week to recheck your eye pressure    See your primary care physician to check your thyroid  
none

## 2025-01-19 ENCOUNTER — HEALTH MAINTENANCE LETTER (OUTPATIENT)
Age: 35
End: 2025-01-19

## 2025-03-11 ENCOUNTER — OFFICE VISIT (OUTPATIENT)
Dept: OPHTHALMOLOGY | Facility: CLINIC | Age: 35
End: 2025-03-11
Attending: OPHTHALMOLOGY
Payer: COMMERCIAL

## 2025-03-11 DIAGNOSIS — Q15.0 OAG (OPEN ANGLE GLAUCOMA), JUVENILE: ICD-10-CM

## 2025-03-11 PROCEDURE — 92083 EXTENDED VISUAL FIELD XM: CPT | Performed by: OPHTHALMOLOGY

## 2025-03-11 PROCEDURE — 99214 OFFICE O/P EST MOD 30 MIN: CPT | Performed by: OPHTHALMOLOGY

## 2025-03-11 PROCEDURE — 99213 OFFICE O/P EST LOW 20 MIN: CPT | Performed by: OPHTHALMOLOGY

## 2025-03-11 PROCEDURE — 92133 CPTRZD OPH DX IMG PST SGM ON: CPT | Performed by: OPHTHALMOLOGY

## 2025-03-11 RX ORDER — BRIMONIDINE TARTRATE 2 MG/ML
1 SOLUTION/ DROPS OPHTHALMIC 3 TIMES DAILY
Qty: 10 ML | Refills: 11 | Status: SHIPPED | OUTPATIENT
Start: 2025-03-11

## 2025-03-11 ASSESSMENT — VISUAL ACUITY
OD_SC: 20/20
METHOD: SNELLEN - LINEAR
OS_SC: 20/125
OS_PH_SC: 2070

## 2025-03-11 ASSESSMENT — CUP TO DISC RATIO
OS_RATIO: 0.8
OD_RATIO: 0.3

## 2025-03-11 ASSESSMENT — TONOMETRY
IOP_METHOD: APPLANATION
OD_IOP_MMHG: 17
OD_IOP_MMHG: 18
IOP_METHOD: TONOPEN
OS_IOP_MMHG: 21
OS_IOP_MMHG: 21

## 2025-03-11 ASSESSMENT — SLIT LAMP EXAM - LIDS
COMMENTS: NORMAL
COMMENTS: NORMAL

## 2025-03-11 ASSESSMENT — CONF VISUAL FIELD: COMMENTS: VF TODAY

## 2025-03-11 ASSESSMENT — EXTERNAL EXAM - RIGHT EYE: OD_EXAM: NORMAL

## 2025-03-11 ASSESSMENT — EXTERNAL EXAM - LEFT EYE: OS_EXAM: NORMAL

## 2025-03-11 NOTE — PROGRESS NOTES
Patient was evaluated by Dr. Morel on 5/5/21 as a glaucoma referral by an outside clinic; he had originally been referred to any eye provider because he was unable to renew his [place] drivers license. He had not had an eye exam prior to this. The outside eye provider ([facility]) found IOP 16/42 mmHg and started him on Timolol 2x/day and Latanoprost at bedtime in the left eye; but he stopped both of these prior to his visit with Dr. Morel due to redness and itching. Patient was evaluated by Neuro-Ophthalmology due to the asymmetry of his disease. After a thorough workup, including MRI brain and orbits, it was ultimately decided his vision loss was related to glaucoma.     Chief Complaint/Presenting Concern: Glaucoma follow up    History of Present Illness:   Silviano Edward is a 31 year old patient who presents for glaucoma follow up. Previously had SLT of the left eye on 5/20/22 with good response. Lost to follow-up until now, 1.5 years later.     Today, 03/11/2025, No new visual or ocular concerns. Lost to follow-up due to Flu. Patient was suppose to be back in Nov. 2024    Current Medications:   Cosopt BID left eye   Latanoprost at bedtime left eye      Relevant Past Medical/Family/Social History: Healthy    Relevant Review of Systems: Weight gain 10-15 lbs since July 2021     Diagnosis: Juvenile Open Angle Glaucoma left eye   Glaucoma suspect right eye   Year diagnosis: 2021  Previous glaucoma surgery/laser:   SLT left eye (5/20/22)  Maximum intraocular pressure 22/47 mmHg   Current Meds: Cosopt sporadically  Family history: negative  CCT: 491/469  Gonio: open to scleral spur 360 in each eye no vascular congestion, light pigmentation   Trauma history: negative  Steroid exposure: negative  Vasospastic disease: Migrane/Raynaud phenomenon: negative  A past hemodynamic crisis or Low BP: negative  Meds AEs/intolerance: ?Timolol and ?latanoprost (itchiness, redness)  PMHx: Negative for asthma and respiratory  problems/Cardiac/Renal/Kidney stones/Sulfa Allergy  Anticoagulants: None    Today testing  OCT Optic Nerve RNFL Spectralis 03/11/2025  right eye: Normal RNFL Thickness, Similar to previous.  left eye: diffuse RNFL thinning, loss of GLC, stable from prior  Visual field 03/11/2025  Right eye: Full Field, Adequate Reliability due to fixation losses, stable from previous.  Left eye: dense superior arcuate defect, poor reliability due fixation losses, Worse from last visit. 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     Additional Diagnosis:     2. Mild Myopia left eye     3. Weight gain, hair loss, constipation  - States his thyroid workup was normal at complete H&P    Plan/Recommendations:  Discussed findings with patient. IOP is elevated at 17 and 26 in both eyes. The RNFL is baseline thin and not a good gauge of progression. Possible new progression nasally on visual field and centrally.  Will aim for IOP <15mmHg given advanced damage and young age from JOAG OS.  Good response to SLT in the past  Continue cosopt, BID, left eye  Continue latanoprost, at bedtime, left eye; start in right eye to prevent asymmetry  IOP elevated today left eye  Start Brimonidine TID left eye  Consider GATT next in the left eye before going to filtration surgery   Start AT's QID PRN    RTC 4 weeks VA, IOP       Physician Attestation     Attending Physician Attestation:  Complete documentation of historical and exam elements from today's encounter can be found in the full encounter summary report (not reduplicated in this progress note). I personally obtained the chief complaint(s) and history of present illness. I confirmed and edited as necessary the review of systems, past medical/surgical history, family history, social history, and examination findings as documented by others; and I examined the patient myself. I personally reviewed the relevant tests, images, and reports as documented above. I personally reviewed the ophthalmic  test(s) associated with this encounter. I formulated and edited as necessary the assessment and plan and discussed the findings and management plan with the patient and any family members present at the time of the visit.  Eri Yusuf M.D., Glaucoma, March 11, 2025

## 2025-03-11 NOTE — NURSING NOTE
Chief Complaints and History of Present Illnesses   Patient presents with    Glaucoma Follow Up     Juvenile Open Angle Glaucoma left eye   Glaucoma suspect right eye        Chief Complaint(s) and History of Present Illness(es)       Glaucoma Follow Up              Comments: Juvenile Open Angle Glaucoma left eye   Glaucoma suspect right eye                 Comments    Patient states has not noticed any vision changes since here last. No ocular pain. No headaches. No redness noticed front of eyes. No extreme photosensitivity. No floaters or flashes of light. Taking ocular medications as recommended. No DM.    Megan Smith on 3/11/2025 at 10:59 AM

## 2025-03-11 NOTE — PATIENT INSTRUCTIONS
Continue latanoprost, in the left eye, at bedtime    Continue the cosopt, twice a day, in the left eye    Start brimonidine 3 times a day on the left eye

## 2025-08-21 ENCOUNTER — OFFICE VISIT (OUTPATIENT)
Dept: INTERNAL MEDICINE | Facility: CLINIC | Age: 35
End: 2025-08-21
Payer: COMMERCIAL

## 2025-08-21 VITALS
OXYGEN SATURATION: 98 % | HEIGHT: 70 IN | HEART RATE: 74 BPM | WEIGHT: 271.4 LBS | BODY MASS INDEX: 38.85 KG/M2 | DIASTOLIC BLOOD PRESSURE: 88 MMHG | SYSTOLIC BLOOD PRESSURE: 130 MMHG | RESPIRATION RATE: 16 BRPM

## 2025-08-21 DIAGNOSIS — Z00.01 ENCOUNTER FOR ROUTINE ADULT MEDICAL EXAM WITH ABNORMAL FINDINGS: Primary | ICD-10-CM

## 2025-08-21 DIAGNOSIS — Z11.59 NEED FOR HEPATITIS C SCREENING TEST: ICD-10-CM

## 2025-08-21 DIAGNOSIS — Q15.0 OAG (OPEN ANGLE GLAUCOMA), JUVENILE: ICD-10-CM

## 2025-08-21 DIAGNOSIS — E66.01 SEVERE OBESITY (BMI 35.0-35.9 WITH COMORBIDITY) (H): ICD-10-CM

## 2025-08-21 DIAGNOSIS — Z13.6 CARDIOVASCULAR SCREENING; LDL GOAL LESS THAN 130: ICD-10-CM

## 2025-08-21 DIAGNOSIS — Z13.1 SCREENING FOR DIABETES MELLITUS: ICD-10-CM

## 2025-08-21 PROBLEM — H40.10X0 GLAUCOMA, OPEN ANGLE: Status: ACTIVE | Noted: 2021-01-01

## 2025-08-21 LAB
ALT SERPL W P-5'-P-CCNC: 18 U/L (ref 0–70)
ANION GAP SERPL CALCULATED.3IONS-SCNC: 11 MMOL/L (ref 7–15)
BUN SERPL-MCNC: 11.1 MG/DL (ref 6–20)
CALCIUM SERPL-MCNC: 9.6 MG/DL (ref 8.8–10.4)
CHLORIDE SERPL-SCNC: 104 MMOL/L (ref 98–107)
CHOLEST SERPL-MCNC: 162 MG/DL
CREAT SERPL-MCNC: 1.02 MG/DL (ref 0.67–1.17)
EGFRCR SERPLBLD CKD-EPI 2021: >90 ML/MIN/1.73M2
ERYTHROCYTE [DISTWIDTH] IN BLOOD BY AUTOMATED COUNT: 12.3 % (ref 10–15)
FASTING STATUS PATIENT QL REPORTED: YES
FASTING STATUS PATIENT QL REPORTED: YES
GLUCOSE SERPL-MCNC: 95 MG/DL (ref 70–99)
HCO3 SERPL-SCNC: 24 MMOL/L (ref 22–29)
HCT VFR BLD AUTO: 40 % (ref 40–53)
HCV AB SERPL QL IA: NONREACTIVE
HDLC SERPL-MCNC: 42 MG/DL
HGB BLD-MCNC: 14.3 G/DL (ref 13.3–17.7)
LDLC SERPL CALC-MCNC: 106 MG/DL
MCH RBC QN AUTO: 29.9 PG (ref 26.5–33)
MCHC RBC AUTO-ENTMCNC: 35.8 G/DL (ref 31.5–36.5)
MCV RBC AUTO: 83.7 FL (ref 78–100)
NONHDLC SERPL-MCNC: 120 MG/DL
PLATELET # BLD AUTO: 329 10E3/UL (ref 150–450)
POTASSIUM SERPL-SCNC: 4.1 MMOL/L (ref 3.4–5.3)
RBC # BLD AUTO: 4.78 10E6/UL (ref 4.4–5.9)
SODIUM SERPL-SCNC: 139 MMOL/L (ref 135–145)
TRIGL SERPL-MCNC: 72 MG/DL
WBC # BLD AUTO: 5.32 10E3/UL (ref 4–11)

## 2025-08-21 SDOH — HEALTH STABILITY: PHYSICAL HEALTH: ON AVERAGE, HOW MANY DAYS PER WEEK DO YOU ENGAGE IN MODERATE TO STRENUOUS EXERCISE (LIKE A BRISK WALK)?: 3 DAYS

## 2025-08-21 SDOH — HEALTH STABILITY: PHYSICAL HEALTH: ON AVERAGE, HOW MANY MINUTES DO YOU ENGAGE IN EXERCISE AT THIS LEVEL?: 30 MIN

## 2025-08-21 ASSESSMENT — SOCIAL DETERMINANTS OF HEALTH (SDOH): HOW OFTEN DO YOU GET TOGETHER WITH FRIENDS OR RELATIVES?: ONCE A WEEK
